# Patient Record
Sex: MALE | Race: WHITE | NOT HISPANIC OR LATINO | URBAN - METROPOLITAN AREA
[De-identification: names, ages, dates, MRNs, and addresses within clinical notes are randomized per-mention and may not be internally consistent; named-entity substitution may affect disease eponyms.]

---

## 2018-07-11 ENCOUNTER — INPATIENT (INPATIENT)
Facility: HOSPITAL | Age: 73
LOS: 0 days | Discharge: HOME | DRG: 313 | End: 2018-07-12
Attending: FAMILY MEDICINE | Admitting: HOSPITALIST
Payer: COMMERCIAL

## 2018-07-11 VITALS — WEIGHT: 216.93 LBS | HEIGHT: 68 IN

## 2018-07-11 DIAGNOSIS — Z98.890 OTHER SPECIFIED POSTPROCEDURAL STATES: Chronic | ICD-10-CM

## 2018-07-11 DIAGNOSIS — I24.9 ACUTE ISCHEMIC HEART DISEASE, UNSPECIFIED: ICD-10-CM

## 2018-07-11 LAB
ALBUMIN SERPL ELPH-MCNC: 3.9 G/DL — SIGNIFICANT CHANGE UP (ref 3.3–5.2)
ALP SERPL-CCNC: 68 U/L — SIGNIFICANT CHANGE UP (ref 40–120)
ALT FLD-CCNC: 16 U/L — SIGNIFICANT CHANGE UP
ANION GAP SERPL CALC-SCNC: 12 MMOL/L — SIGNIFICANT CHANGE UP (ref 5–17)
APTT BLD: 31.3 SEC — SIGNIFICANT CHANGE UP (ref 27.5–37.4)
AST SERPL-CCNC: 16 U/L — SIGNIFICANT CHANGE UP
BASOPHILS # BLD AUTO: 0 K/UL — SIGNIFICANT CHANGE UP (ref 0–0.2)
BASOPHILS NFR BLD AUTO: 0.4 % — SIGNIFICANT CHANGE UP (ref 0–2)
BILIRUB SERPL-MCNC: 0.5 MG/DL — SIGNIFICANT CHANGE UP (ref 0.4–2)
BUN SERPL-MCNC: 17 MG/DL — SIGNIFICANT CHANGE UP (ref 8–20)
CALCIUM SERPL-MCNC: 9.5 MG/DL — SIGNIFICANT CHANGE UP (ref 8.6–10.2)
CHLORIDE SERPL-SCNC: 104 MMOL/L — SIGNIFICANT CHANGE UP (ref 98–107)
CK SERPL-CCNC: 64 U/L — SIGNIFICANT CHANGE UP (ref 30–200)
CO2 SERPL-SCNC: 24 MMOL/L — SIGNIFICANT CHANGE UP (ref 22–29)
CREAT SERPL-MCNC: 0.95 MG/DL — SIGNIFICANT CHANGE UP (ref 0.5–1.3)
EOSINOPHIL # BLD AUTO: 0.1 K/UL — SIGNIFICANT CHANGE UP (ref 0–0.5)
EOSINOPHIL NFR BLD AUTO: 1.4 % — SIGNIFICANT CHANGE UP (ref 0–5)
GLUCOSE SERPL-MCNC: 105 MG/DL — SIGNIFICANT CHANGE UP (ref 70–115)
HCT VFR BLD CALC: 44.4 % — SIGNIFICANT CHANGE UP (ref 42–52)
HGB BLD-MCNC: 15.2 G/DL — SIGNIFICANT CHANGE UP (ref 14–18)
INR BLD: 1.06 RATIO — SIGNIFICANT CHANGE UP (ref 0.88–1.16)
LYMPHOCYTES # BLD AUTO: 1.4 K/UL — SIGNIFICANT CHANGE UP (ref 1–4.8)
LYMPHOCYTES # BLD AUTO: 25.4 % — SIGNIFICANT CHANGE UP (ref 20–55)
MCHC RBC-ENTMCNC: 30 PG — SIGNIFICANT CHANGE UP (ref 27–31)
MCHC RBC-ENTMCNC: 34.2 G/DL — SIGNIFICANT CHANGE UP (ref 32–36)
MCV RBC AUTO: 87.6 FL — SIGNIFICANT CHANGE UP (ref 80–94)
MONOCYTES # BLD AUTO: 0.4 K/UL — SIGNIFICANT CHANGE UP (ref 0–0.8)
MONOCYTES NFR BLD AUTO: 7.2 % — SIGNIFICANT CHANGE UP (ref 3–10)
NEUTROPHILS # BLD AUTO: 3.6 K/UL — SIGNIFICANT CHANGE UP (ref 1.8–8)
NEUTROPHILS NFR BLD AUTO: 65.2 % — SIGNIFICANT CHANGE UP (ref 37–73)
NT-PROBNP SERPL-SCNC: 99 PG/ML — SIGNIFICANT CHANGE UP (ref 0–300)
PLATELET # BLD AUTO: 166 K/UL — SIGNIFICANT CHANGE UP (ref 150–400)
POTASSIUM SERPL-MCNC: 3.9 MMOL/L — SIGNIFICANT CHANGE UP (ref 3.5–5.3)
POTASSIUM SERPL-SCNC: 3.9 MMOL/L — SIGNIFICANT CHANGE UP (ref 3.5–5.3)
PROT SERPL-MCNC: 7.1 G/DL — SIGNIFICANT CHANGE UP (ref 6.6–8.7)
PROTHROM AB SERPL-ACNC: 11.7 SEC — SIGNIFICANT CHANGE UP (ref 9.8–12.7)
RBC # BLD: 5.07 M/UL — SIGNIFICANT CHANGE UP (ref 4.6–6.2)
RBC # FLD: 13.6 % — SIGNIFICANT CHANGE UP (ref 11–15.6)
SODIUM SERPL-SCNC: 140 MMOL/L — SIGNIFICANT CHANGE UP (ref 135–145)
TROPONIN T SERPL-MCNC: <0.01 NG/ML — SIGNIFICANT CHANGE UP (ref 0–0.06)
TROPONIN T SERPL-MCNC: <0.01 NG/ML — SIGNIFICANT CHANGE UP (ref 0–0.06)
WBC # BLD: 5.5 K/UL — SIGNIFICANT CHANGE UP (ref 4.8–10.8)
WBC # FLD AUTO: 5.5 K/UL — SIGNIFICANT CHANGE UP (ref 4.8–10.8)

## 2018-07-11 PROCEDURE — 99223 1ST HOSP IP/OBS HIGH 75: CPT | Mod: GC

## 2018-07-11 PROCEDURE — 99285 EMERGENCY DEPT VISIT HI MDM: CPT

## 2018-07-11 PROCEDURE — 71275 CT ANGIOGRAPHY CHEST: CPT | Mod: 26

## 2018-07-11 PROCEDURE — 71046 X-RAY EXAM CHEST 2 VIEWS: CPT | Mod: 26

## 2018-07-11 PROCEDURE — 93010 ELECTROCARDIOGRAM REPORT: CPT

## 2018-07-11 PROCEDURE — 74174 CTA ABD&PLVS W/CONTRAST: CPT | Mod: 26

## 2018-07-11 RX ORDER — DIPHENHYDRAMINE HCL 50 MG
50 CAPSULE ORAL ONCE
Qty: 0 | Refills: 0 | Status: COMPLETED | OUTPATIENT
Start: 2018-07-11 | End: 2018-07-11

## 2018-07-11 RX ORDER — PANTOPRAZOLE SODIUM 20 MG/1
40 TABLET, DELAYED RELEASE ORAL
Qty: 0 | Refills: 0 | Status: DISCONTINUED | OUTPATIENT
Start: 2018-07-11 | End: 2018-07-12

## 2018-07-11 RX ORDER — MORPHINE SULFATE 50 MG/1
1 CAPSULE, EXTENDED RELEASE ORAL ONCE
Qty: 0 | Refills: 0 | Status: DISCONTINUED | OUTPATIENT
Start: 2018-07-11 | End: 2018-07-12

## 2018-07-11 RX ORDER — CLOPIDOGREL BISULFATE 75 MG/1
75 TABLET, FILM COATED ORAL DAILY
Qty: 0 | Refills: 0 | Status: DISCONTINUED | OUTPATIENT
Start: 2018-07-11 | End: 2018-07-12

## 2018-07-11 RX ORDER — NITROGLYCERIN 6.5 MG
0.4 CAPSULE, EXTENDED RELEASE ORAL
Qty: 0 | Refills: 0 | Status: DISCONTINUED | OUTPATIENT
Start: 2018-07-11 | End: 2018-07-12

## 2018-07-11 RX ORDER — ISOSORBIDE MONONITRATE 60 MG/1
120 TABLET, EXTENDED RELEASE ORAL DAILY
Qty: 0 | Refills: 0 | Status: DISCONTINUED | OUTPATIENT
Start: 2018-07-11 | End: 2018-07-12

## 2018-07-11 RX ORDER — LEVOTHYROXINE SODIUM 125 MCG
175 TABLET ORAL DAILY
Qty: 0 | Refills: 0 | Status: DISCONTINUED | OUTPATIENT
Start: 2018-07-11 | End: 2018-07-12

## 2018-07-11 RX ORDER — HYDROCORTISONE 20 MG
200 TABLET ORAL ONCE
Qty: 0 | Refills: 0 | Status: COMPLETED | OUTPATIENT
Start: 2018-07-11 | End: 2018-07-11

## 2018-07-11 RX ORDER — FINASTERIDE 5 MG/1
5 TABLET, FILM COATED ORAL DAILY
Qty: 0 | Refills: 0 | Status: DISCONTINUED | OUTPATIENT
Start: 2018-07-11 | End: 2018-07-12

## 2018-07-11 RX ORDER — ASPIRIN/CALCIUM CARB/MAGNESIUM 324 MG
325 TABLET ORAL DAILY
Qty: 0 | Refills: 0 | Status: DISCONTINUED | OUTPATIENT
Start: 2018-07-11 | End: 2018-07-12

## 2018-07-11 RX ORDER — ATORVASTATIN CALCIUM 80 MG/1
20 TABLET, FILM COATED ORAL AT BEDTIME
Qty: 0 | Refills: 0 | Status: DISCONTINUED | OUTPATIENT
Start: 2018-07-11 | End: 2018-07-12

## 2018-07-11 RX ORDER — LISINOPRIL 2.5 MG/1
5 TABLET ORAL DAILY
Qty: 0 | Refills: 0 | Status: DISCONTINUED | OUTPATIENT
Start: 2018-07-11 | End: 2018-07-12

## 2018-07-11 RX ORDER — METOPROLOL TARTRATE 50 MG
50 TABLET ORAL DAILY
Qty: 0 | Refills: 0 | Status: DISCONTINUED | OUTPATIENT
Start: 2018-07-11 | End: 2018-07-12

## 2018-07-11 RX ADMIN — Medication 200 MILLIGRAM(S): at 16:02

## 2018-07-11 RX ADMIN — Medication 50 MILLIGRAM(S): at 18:41

## 2018-07-11 NOTE — H&P ADULT - FAMILY HISTORY
Family history of prostate cancer in father     Father  Still living? Unknown  Family history of rheumatoid arthritis, Age at diagnosis: Age Unknown     Mother  Still living? Unknown  Family history of acute myocardial infarction, Age at diagnosis: Age Unknown

## 2018-07-11 NOTE — H&P ADULT - NSHPPHYSICALEXAM_GEN_ALL_CORE
Vitals  T(C): 36.7 (07-11-18 @ 12:54), Max: 36.7 (07-11-18 @ 12:54)  HR: 60 (07-11-18 @ 18:12) (56 - 60)  BP: 134/77 (07-11-18 @ 18:12) (134/77 - 149/84)  RR: 18 (07-11-18 @ 18:12) (18 - 18)  SpO2: 99% (07-11-18 @ 18:12) (98% - 99%)    Physical Exam:   GENERAL: elderly, NAD, well-groomed, well-developed  HEAD:  Atraumatic, Normocephalic  EYES: EOMI, PERRLA, conjunctiva and sclera clear  NECK: Supple, No JVD, Normal thyroid  NERVOUS SYSTEM:  Alert & Oriented X3, Good concentration; Motor Strength 5/5 B/L upper and lower extremities;   RESP: Clear to auscultation bilaterally; No rales, rhonchi, wheezing, or rubs  CVS: Bradycardic, regular rhythm; No murmurs, rubs, or gallops  GI: Soft, Nontender, Nondistended; Bowel sounds present, reducible umbilical hernia present  EXTREMITIES:  2+ Peripheral Pulses, No clubbing, cyanosis, or edema, varicose veins over anterior RLE  LYMPH: No cervical or supraclavicular lymphadenopathy noted  SKIN: two 2cm coin shaped lesions of left cheek

## 2018-07-11 NOTE — H&P ADULT - ASSESSMENT
71 yo male with PMH of CAD w/ 9 stents, HTN, BPH, hypothyroidism, DVT formerly on Coumadin, GERD, skin cancer presents w/ chest pain being admitted for r/o ACS.  Admit to Resident Service under care of Dr Verdin  Admit to Telemetry unit  Activity:  Diet: DASH/TLC    Chest pain r/o ACS  Resolved since presentation to ED  EKG: no acute ST changes or T wave invsersions  Trops: neg x2  CTA Chest:   Continue ASA 325mg QD, Plavix 75mg QD  Start Lisinopril 5mg QD w/ parameters  TTE pending  Pharmacological stress test in AM  Cardio consulted, apprec rec 73 yo male with PMH of CAD w/ 9 stents, HTN, BPH, hypothyroidism, DVT formerly on Coumadin, GERD, skin cancer presents w/ chest pain being admitted for r/o ACS.  Admit to Resident Service under care of Dr Verdin  Admit to Telemetry unit  Activity:  Diet: DASH/TLC    Chest pain r/o ACS  Resolved since presentation to ED  EKG: no acute ST changes or T wave invsersions  Trops: neg x2  CTA Chest: neg for aortic aneurysm or dissection, or PE  Continue ASA 325mg QD, Plavix 75mg QD  Continue Atorvastatin 20mg QD  Continue Metoprolol 50mg QD  Start Lisinopril 5mg QD w/ parameters  TTE pending  Lipid panel and HbA1c in AM  Pharmacological stress test in AM  Cardio consulted, apprec rec, hold Toprol if HR <30    Hypertension  Currently stable  Continue home meds, Metoprolol, Imdur  Will monitor    BPH  Stable, at baseline  Continue home meds, Rapaflo, Finasteride    Hypothyroidism  Continue home med, Synthroid 175mcg QD  TSH in AM    GERD  Stable  Continue Pantoprazole 40mg QD    Preventative Measure  Lovenox 40mg QD  VCD Boots 71 yo male with PMH of CAD w/ 9 stents, HTN, BPH, hypothyroidism, DVT formerly on Coumadin, GERD, skin cancer presents w/ chest pain being admitted for r/o ACS.  Admit to Resident Service under care of Dr Verdin  Admit to Telemetry unit  Activity:  Diet: DASH/TLC    Chest pain r/o ACS  Resolved since presentation to ED  EKG: no acute ST changes or T wave invsersions  Trops: neg x2  CTA Chest: neg for aortic aneurysm or dissection, or PE  Nitroglycerin subling PRN for chest pain  Morphine 1mg IV PRN for severe chest pain  Continue ASA 325mg QD, Plavix 75mg QD  Continue Atorvastatin 20mg QD  Continue Metoprolol 50mg QD  Start Lisinopril 5mg QD w/ parameters  TTE pending  Lipid panel and HbA1c in AM  Pharmacological stress test in AM  Cardio consulted, apprec rec, hold Toprol if HR <30    Hypertension  Currently stable  Continue home meds, Metoprolol, Imdur  Will monitor    BPH  Stable, at baseline  Continue home meds, Rapaflo, Finasteride    Hypothyroidism  Continue home med, Synthroid 175mcg QD  TSH in AM    GERD  Stable  Continue Pantoprazole 40mg QD    Preventative Measure  Lovenox 40mg QD  VCD Boots

## 2018-07-11 NOTE — ED STATDOCS - PROGRESS NOTE DETAILS
72 year old male with PMh HTN, HLD anmd CAD s/p 9 stents presents with chest and arm pain. Pt states he woke from sleep with a left arm pain, left sided chest pressure, and numbness in his 4th and 5th digits. He reports associated SOb with the pain. States pain has improved.  Gen: NAD, well appearing CV: RRR Pul: CTA b/l Abd: Soft, non-distended, non-tender Neuro: no focal deficits  ekg no stemi, will upgrade to main

## 2018-07-11 NOTE — ED PROVIDER NOTE - MUSCULOSKELETAL, MLM
Spine appears normal, range of motion is not limited, no muscle or joint tenderness, currently does not have any back pain

## 2018-07-11 NOTE — CONSULT NOTE ADULT - SUBJECTIVE AND OBJECTIVE BOX
[facility]                                                        Office: [address] [address]                                                       Telephone: [phone]. Fax:[phone]    Patient is a 72y old  Male who presents with a chief complaint of chest pain    HPI: 73 y/o male with hx of CAD, MI 17 yrs prior, with multiple PCI (last of which > 10 yrs ago), smoker, RLE DVT, hypertension, hyperlipidemia, hypothyroidism, who has not been to his outpatient cardiologist (Dr. Houston Randolph) in the past 5 years, presents with acute onset chest pain this morning associated with left arm numbness, including 3 medial digits on the left hand.  Associated with jaw tightness.  These symptoms were similar to what he felt during his MI 17 yrs ago. Chest discomfort was mild to moderate in intensity, left sided,pressure-like with radiation to the back, without associated shortness of breath, lightheadedness, diaphoresis.  Duration ~ a couple of hours.  No specific provocators or alleviators.  Currently denies any chest pain.  Left hand numbness ((ulnar nerve distribution) persists.  ECG sinus rhythm with PACs.  Currently sinus bradycardia 48 bpm on telemetry.  CXR unremarkable. First set of cardiac enzymes negative. Tolerated IV contrast in past, reports shortness of breath after Definity (echo contrast ) administration during a stress echocardiogram several years ago.     PAST MEDICAL & SURGICAL HISTORY:  CAD, MI 17 yrs prior, with multiple PCI (last of which > 10 yrs ago), hypertension, hyperlipidemia, hypothyroidism, DVT  rotator cuff surgery left  right shoulder and arm injury  skin cancer    Allergies    Definity (Short breath)  IV Contrast (Short breath)    Intolerances        Home Medications:  aspirin 325  metoprolol er 50  rapaflo 8  levothyroxine 175  atorvastatin 20  clopidogrel 75  imdur 120  finasteride 5  omeprazole 20      MEDICATIONS  (STANDING):    MEDICATIONS  (PRN):    FAMILY HISTORY: no pertinent family hx      SOCIAL HISTORY: + tobacco/no  etoh/ No illicit drug use    PREVIOUS DIAGNOSTIC TESTING:  NONE IN OUR SYSTEM    REVIEW OF SYSTEMS:  CONSTITUTIONAL: [-]fever   [-] weight loss   [-] fatigue  EYES: [-]  eye pain   [-] visual disturbances      [-]  discharge  ENMT:  [-]  difficulty hearing,   [-]  tinnitus   [-] vertigo    [-]  sinus or throat pain  NECK: [-]  pain or stiffness  RESPIRATORY: [-]  cough 	[-] wheezing 	[-]  hemoptysis 		[-]   Shortness of Breath  CARDIOVASCULAR: [+]  chest pain	[-] palpitations		[-]  passing out 		[-] dizziness 	[-]  leg swelling  		[-]  PND 	[-] orthopnea  GASTROINTESTINAL: [-]  abdominal pain		[-]nausea	[-] vomiting	[-]  hematemesis 	[-]  diarrhea  	[-] constipation 		[-]  melena 	[-] hematochezia.  GENITOURINARY: [-] dysuria	[-] frequency	[-] hematuria	[-]  incontinence  NEUROLOGICAL: [-]  headaches		[-] memory loss 	[-]  loss of strength  			[+]  numbness/tingling 	[-]  tremors  SKIN: [-]  itching 	[-] rashes 	[-]  lesions   LYMPH Nodes: [-] enlarged glands  ENDOCRINE: [-] heat or cold intolerance 	[-]   hair loss  MUSCULOSKELETAL: [-] joint pain  [-] joint swelling	[-]  muscle, back, or extremity pain  PSYCHIATRIC: [-]  depression	[-] anxiety	[-] mood swings		[-]  difficulty sleeping   HEME: [-]  easy bruising 	[-]  bleeding   ALLERY AND IMMUNOLOGIC: [-]  hives or eczema	    Vital Signs Last 24 Hrs  T(C): 36.7 (11 Jul 2018 12:54), Max: 36.7 (11 Jul 2018 12:54)  T(F): 98 (11 Jul 2018 12:54), Max: 98 (11 Jul 2018 12:54)  HR: 56 (11 Jul 2018 12:54) (56 - 56)  BP: 149/84 (11 Jul 2018 12:54) (149/84 - 149/84)  RR: 18 (11 Jul 2018 12:54) (18 - 18)  SpO2: 98% (11 Jul 2018 12:54) (98% - 98%)  Daily Height in cm: 172.72 (11 Jul 2018 12:44)      PHYSICAL EXAM:  Appearance: Normal, well nourished, NAD	  HEENT:   Normal oral mucosa, PERRL, EOMI, sclera non-icteric	  Lymphatic: No cervical lymphadenopathy  Cardiovascular: Normal S1 S2, No JVD, No cardiac murmurs, No carotid bruits, No peripheral edema  Respiratory: Lungs clear to auscultation	  Psychiatry: A & O x 3, Mood & affect appropriate  Gastrointestinal:  Soft, Non-tender, + BS, no bruits	  Skin: No rashes, No ecchymoses, No cyanosis, Dry  Neurologic: Grossly non-focal with full strength in all four extremities  Extremities: Normal range of motion, No clubbing, cyanosis or edema  Vascular: Peripheral pulses palpable 2+ bilaterally, warm    INTERPRETATION OF TELEMETRY: SR 40s-50s    ECG (tracing reviewed by me): sinus bradycardia, PACs     LABS:                        15.2   5.5   )-----------( 166      ( 11 Jul 2018 13:49 )             44.4     07-11    140  |  104  |  17.0  ----------------------------<  105  3.9   |  24.0  |  0.95    Ca    9.5      11 Jul 2018 13:49    TPro  7.1  /  Alb  3.9  /  TBili  0.5  /  DBili  x   /  AST  16  /  ALT  16  /  AlkPhos  68  07-11    CARDIAC MARKERS ( 11 Jul 2018 13:49 )  x     / <0.01 ng/mL / 64 U/L / x     / x        PT/INR - ( 11 Jul 2018 13:49 )   PT: 11.7 sec;   INR: 1.06 ratio         PTT - ( 11 Jul 2018 13:49 )  PTT:31.3 sec    BNPSerum Pro-Brain Natriuretic Peptide: 99 pg/mL (07-11 @ 13:49)    RADIOLOGY & ADDITIONAL STUDIES:  CXR (image reviewed by me): < from: Xray Chest 2 Views PA/Lat (07.11.18 @ 14:32) >  The cardiomediastinal silhouette is normal. The richard are not enlarged.   The trachea is midline. There is no focal infiltrate or pleural effusion.   The osseous structures demonstrate osteopenia and extensive productive   change in the thoracic spine.    Impression: No active disease.    < end of copied text >

## 2018-07-11 NOTE — ED ADULT TRIAGE NOTE - CHIEF COMPLAINT QUOTE
pt c/o midsternal CP, left arm pain and numbess/tingling to left finger tips. pt states he awoke with this pain. pt denies SOB. and states the pain radiates into his back. resp even unlabored.

## 2018-07-11 NOTE — ED ADULT NURSE REASSESSMENT NOTE - NS ED NURSE REASSESS COMMENT FT1
pt status unchanged, refer to flowsheet and chart, pt safety maintained, pt hemodynamically stable, pending CT

## 2018-07-11 NOTE — ED PROVIDER NOTE - CHPI ED SYMPTOMS POS
BACK PAIN/CHEST TIGHTNESS/SHORTNESS OF BREATH/CHEST PAIN/sub sternal chest pain radiating to right inferior scapula

## 2018-07-11 NOTE — ED PROVIDER NOTE - ATTENDING CONTRIBUTION TO CARE
After interviewing the patient, I agree with the HPI as discussed . My personal exam reveals findings consistent with those discussed. Available diagnostic studies were reviewed. I confirm the diagnosis as discussed with the resident The care plan articulated is consistent with our discussion of the patient's particulars. In brief, Hx [chest pain ],Exam sis2 normal no tenderness ], Plan[pt was seen by cardiology and will be admitted for stress test and further work-up ]

## 2018-07-11 NOTE — ED PROVIDER NOTE - OBJECTIVE STATEMENT
Patient is a 73yo male complaining of substernal chest pain that radiated to the back that has dissipated. Patient is also complaining of numbness and tingling down his left hand to his 3rd,4th and 5th fingers that he describes as pins and needles. Patient states that he has had multiple rotator cuff injuries and surgeries on his left shoulder. Patient only took his aspirin this morning. Patient states the pain in his chest was the same pain that he felt when he had his MI. Patient states that he does not have any pain now but when he did it was also accompanied by SOB. Patient describes the chest pain as someone punched him in the chest.   PMH: MI, HTN, Hypothyroidism   PSH: 9 stents placed, rotator cuff surgery on left shoulder  allergies: IV dye, and Infinity contrast  SH: drinks 1-2 beers socially, has been smoking 4 cigarettes a day for 55 years

## 2018-07-11 NOTE — H&P ADULT - PMH
BPH (benign prostatic hyperplasia)    CAD (coronary artery disease)    DVT (deep venous thrombosis)    GERD (gastroesophageal reflux disease)    HTN (hypertension)    Hypothyroidism    Skin cancer

## 2018-07-11 NOTE — H&P ADULT - HISTORY OF PRESENT ILLNESS
73 yo male with PMH of CAD w/ 9 stents, HTN, BPH, hypothyroidism, DVT formerly on Coumadin, GERD, skin cancer presents w/ chest pain which began at 5 AM. he described the pain as a 4/10 lasting 7 hours, pressure-like, in the center of his chest, radiating to the back. associated with SOB that lasted 2.5 hrs. not worsened with exertion, not alleviated by rest. patient did not take any medication. no asscd diaphoresis or N/V. pain is similar to pain experienced w/ prior MI. patient's last cath was five years ago, as well as last time he saw the cardiologist. he has been experiencing similar pain twice per wk for 2-3 mths, lasting 1-2 hours each episode.   patient also experiencing numbness in left arm which began around same time as chest pain today. numbness in arm has subsided, some remaining tingling in left fingers. patient reports episodes of similar numbness "every now and then", believes it is related to position he sleeps. normally resolves on own, today persisted longer than usual.

## 2018-07-11 NOTE — H&P ADULT - NSHPSOCIALHISTORY_GEN_ALL_CORE
smokes 10 cigarettes/wk. smoking since 16 yo, quit briefly after MI in 2001.  denies alcohol use, recreational drug use

## 2018-07-11 NOTE — CONSULT NOTE ADULT - ASSESSMENT
71 y/o male with hx of CAD, MI 17 yrs prior, with multiple PCI (last of which > 10 yrs ago), smoker, RLE DVT, hypertension, hyperlipidemia, BPH, hypothyroidism, who has not been to his outpatient cardiologist (Dr. Houston Randolph) in the past 5 years, presents with acute onset chest pain this morning associated with left arm numbness, including 3 medial digits on the left hand.  Associated with jaw tightness.  These symptoms were similar to what he felt during his MI 17 yrs ago. Chest discomfort was mild to moderate in intensity, left sided,pressure-like with radiation to the back, without associated shortness of breath, lightheadedness, diaphoresis.  Duration ~ a couple of hours.  No specific provocators or alleviators. Currently denies any chest pain.  Left hand numbness ((ulnar nerve distribution) persists.  ECG sinus rhythm with PACs.  Currently sinus bradycardia 48 bpm on telemetry.  CXR unremarkable. First set of cardiac enzymes negative. Definity allergy/reaction.    # chest pain - will need ischemia evaluation, doubt aortic dissection, CTA chest pending.  If CTA chest unremarkable, will arrange for pharmacological nuclear stress testing in am.  Echo (without contrast use).  Monitor on telemetry. ASA. Metoprolol, statin, plavix.   # CAD s/p MI in distant past, multiple PCI "9 stents" - as above  # tobacco use - counseled on cessation.   # hyperlipidemia - resume statin  # hypertension- resume antihypertensives and BPH meds (toprol, rapaflo, imdur, finasteride)  # sinus bradycardia - ok to continue metoprolol, monitor on telemetry.  If significant pauses or HRs in the 30s overnight, will reduce toprol dose. TTE.     LMWH or Heparin for DVT prophylaxis  D/W Dr. Rasheed

## 2018-07-11 NOTE — ED PROVIDER NOTE - MEDICAL DECISION MAKING DETAILS
Patient with possible acs with significant cardiac history. Patient had labs drawn, EKG, and a cardiac consult.

## 2018-07-11 NOTE — ED ADULT NURSE NOTE - OBJECTIVE STATEMENT
c/o intermittent chest pain since 0530 with intermittent numbness and tingling in fiongertips. Hx CAD, MI, HTN, HLD

## 2018-07-12 VITALS — HEART RATE: 50 BPM | TEMPERATURE: 99 F | SYSTOLIC BLOOD PRESSURE: 126 MMHG | DIASTOLIC BLOOD PRESSURE: 70 MMHG

## 2018-07-12 LAB
ANION GAP SERPL CALC-SCNC: 14 MMOL/L — SIGNIFICANT CHANGE UP (ref 5–17)
BUN SERPL-MCNC: 17 MG/DL — SIGNIFICANT CHANGE UP (ref 8–20)
CALCIUM SERPL-MCNC: 9.4 MG/DL — SIGNIFICANT CHANGE UP (ref 8.6–10.2)
CHLORIDE SERPL-SCNC: 104 MMOL/L — SIGNIFICANT CHANGE UP (ref 98–107)
CHOLEST SERPL-MCNC: 107 MG/DL — LOW (ref 110–199)
CO2 SERPL-SCNC: 25 MMOL/L — SIGNIFICANT CHANGE UP (ref 22–29)
CREAT SERPL-MCNC: 0.94 MG/DL — SIGNIFICANT CHANGE UP (ref 0.5–1.3)
GLUCOSE SERPL-MCNC: 116 MG/DL — HIGH (ref 70–115)
HBA1C BLD-MCNC: 5.7 % — HIGH (ref 4–5.6)
HDLC SERPL-MCNC: 35 MG/DL — LOW
LIPID PNL WITH DIRECT LDL SERPL: 58 MG/DL — SIGNIFICANT CHANGE UP
MAGNESIUM SERPL-MCNC: 2 MG/DL — SIGNIFICANT CHANGE UP (ref 1.8–2.6)
PHOSPHATE SERPL-MCNC: 3.4 MG/DL — SIGNIFICANT CHANGE UP (ref 2.4–4.7)
POTASSIUM SERPL-MCNC: 4 MMOL/L — SIGNIFICANT CHANGE UP (ref 3.5–5.3)
POTASSIUM SERPL-SCNC: 4 MMOL/L — SIGNIFICANT CHANGE UP (ref 3.5–5.3)
SODIUM SERPL-SCNC: 143 MMOL/L — SIGNIFICANT CHANGE UP (ref 135–145)
TOTAL CHOLESTEROL/HDL RATIO MEASUREMENT: 3 RATIO — LOW (ref 3.4–9.6)
TRIGL SERPL-MCNC: 72 MG/DL — SIGNIFICANT CHANGE UP (ref 10–200)
TROPONIN T SERPL-MCNC: <0.01 NG/ML — SIGNIFICANT CHANGE UP (ref 0–0.06)
TSH SERPL-MCNC: 0.28 UIU/ML — SIGNIFICANT CHANGE UP (ref 0.27–4.2)

## 2018-07-12 PROCEDURE — 85610 PROTHROMBIN TIME: CPT

## 2018-07-12 PROCEDURE — 93017 CV STRESS TEST TRACING ONLY: CPT

## 2018-07-12 PROCEDURE — 85027 COMPLETE CBC AUTOMATED: CPT

## 2018-07-12 PROCEDURE — 96374 THER/PROPH/DIAG INJ IV PUSH: CPT | Mod: XU

## 2018-07-12 PROCEDURE — A9500: CPT

## 2018-07-12 PROCEDURE — 36415 COLL VENOUS BLD VENIPUNCTURE: CPT

## 2018-07-12 PROCEDURE — 71275 CT ANGIOGRAPHY CHEST: CPT

## 2018-07-12 PROCEDURE — 99232 SBSQ HOSP IP/OBS MODERATE 35: CPT

## 2018-07-12 PROCEDURE — 83735 ASSAY OF MAGNESIUM: CPT

## 2018-07-12 PROCEDURE — 93018 CV STRESS TEST I&R ONLY: CPT

## 2018-07-12 PROCEDURE — 83036 HEMOGLOBIN GLYCOSYLATED A1C: CPT

## 2018-07-12 PROCEDURE — 84443 ASSAY THYROID STIM HORMONE: CPT

## 2018-07-12 PROCEDURE — 74174 CTA ABD&PLVS W/CONTRAST: CPT

## 2018-07-12 PROCEDURE — 80053 COMPREHEN METABOLIC PANEL: CPT

## 2018-07-12 PROCEDURE — 83880 ASSAY OF NATRIURETIC PEPTIDE: CPT

## 2018-07-12 PROCEDURE — 78452 HT MUSCLE IMAGE SPECT MULT: CPT

## 2018-07-12 PROCEDURE — 99285 EMERGENCY DEPT VISIT HI MDM: CPT | Mod: 25

## 2018-07-12 PROCEDURE — 93005 ELECTROCARDIOGRAM TRACING: CPT

## 2018-07-12 PROCEDURE — 99238 HOSP IP/OBS DSCHRG MGMT 30/<: CPT

## 2018-07-12 PROCEDURE — 80061 LIPID PANEL: CPT

## 2018-07-12 PROCEDURE — 71046 X-RAY EXAM CHEST 2 VIEWS: CPT

## 2018-07-12 PROCEDURE — 84484 ASSAY OF TROPONIN QUANT: CPT

## 2018-07-12 PROCEDURE — 85730 THROMBOPLASTIN TIME PARTIAL: CPT

## 2018-07-12 PROCEDURE — 93306 TTE W/DOPPLER COMPLETE: CPT

## 2018-07-12 PROCEDURE — 96375 TX/PRO/DX INJ NEW DRUG ADDON: CPT | Mod: XU

## 2018-07-12 PROCEDURE — 80048 BASIC METABOLIC PNL TOTAL CA: CPT

## 2018-07-12 PROCEDURE — 82550 ASSAY OF CK (CPK): CPT

## 2018-07-12 PROCEDURE — 84100 ASSAY OF PHOSPHORUS: CPT

## 2018-07-12 PROCEDURE — 93016 CV STRESS TEST SUPVJ ONLY: CPT

## 2018-07-12 PROCEDURE — 78452 HT MUSCLE IMAGE SPECT MULT: CPT | Mod: 26

## 2018-07-12 RX ORDER — METOPROLOL TARTRATE 50 MG
25 TABLET ORAL DAILY
Qty: 0 | Refills: 0 | Status: DISCONTINUED | OUTPATIENT
Start: 2018-07-12 | End: 2018-07-12

## 2018-07-12 RX ORDER — FINASTERIDE 5 MG/1
1 TABLET, FILM COATED ORAL
Qty: 0 | Refills: 0 | COMMUNITY

## 2018-07-12 RX ORDER — ASPIRIN/CALCIUM CARB/MAGNESIUM 324 MG
1 TABLET ORAL
Qty: 0 | Refills: 0 | COMMUNITY

## 2018-07-12 RX ORDER — CLOPIDOGREL BISULFATE 75 MG/1
1 TABLET, FILM COATED ORAL
Qty: 0 | Refills: 0 | COMMUNITY

## 2018-07-12 RX ORDER — METOPROLOL TARTRATE 50 MG
1 TABLET ORAL
Qty: 0 | Refills: 0 | COMMUNITY

## 2018-07-12 RX ORDER — ISOSORBIDE MONONITRATE 60 MG/1
1 TABLET, EXTENDED RELEASE ORAL
Qty: 0 | Refills: 0 | COMMUNITY

## 2018-07-12 RX ORDER — LEVOTHYROXINE SODIUM 125 MCG
1 TABLET ORAL
Qty: 0 | Refills: 0 | COMMUNITY

## 2018-07-12 RX ORDER — ATORVASTATIN CALCIUM 80 MG/1
1 TABLET, FILM COATED ORAL
Qty: 0 | Refills: 0 | COMMUNITY

## 2018-07-12 RX ORDER — ENOXAPARIN SODIUM 100 MG/ML
40 INJECTION SUBCUTANEOUS EVERY 24 HOURS
Qty: 0 | Refills: 0 | Status: DISCONTINUED | OUTPATIENT
Start: 2018-07-12 | End: 2018-07-12

## 2018-07-12 RX ORDER — SILODOSIN 4 MG/1
1 CAPSULE ORAL
Qty: 0 | Refills: 0 | COMMUNITY

## 2018-07-12 RX ORDER — OMEPRAZOLE 10 MG/1
1 CAPSULE, DELAYED RELEASE ORAL
Qty: 0 | Refills: 0 | COMMUNITY

## 2018-07-12 RX ORDER — METOPROLOL TARTRATE 50 MG
1 TABLET ORAL
Qty: 30 | Refills: 0 | OUTPATIENT
Start: 2018-07-12 | End: 2018-08-10

## 2018-07-12 RX ORDER — METOPROLOL TARTRATE 50 MG
25 TABLET ORAL EVERY 24 HOURS
Qty: 0 | Refills: 0 | Status: DISCONTINUED | OUTPATIENT
Start: 2018-07-12 | End: 2018-07-12

## 2018-07-12 RX ADMIN — Medication 325 MILLIGRAM(S): at 12:53

## 2018-07-12 RX ADMIN — CLOPIDOGREL BISULFATE 75 MILLIGRAM(S): 75 TABLET, FILM COATED ORAL at 12:53

## 2018-07-12 RX ADMIN — ATORVASTATIN CALCIUM 20 MILLIGRAM(S): 80 TABLET, FILM COATED ORAL at 01:35

## 2018-07-12 RX ADMIN — Medication 175 MICROGRAM(S): at 05:44

## 2018-07-12 RX ADMIN — PANTOPRAZOLE SODIUM 40 MILLIGRAM(S): 20 TABLET, DELAYED RELEASE ORAL at 05:43

## 2018-07-12 RX ADMIN — ISOSORBIDE MONONITRATE 120 MILLIGRAM(S): 60 TABLET, EXTENDED RELEASE ORAL at 12:53

## 2018-07-12 RX ADMIN — FINASTERIDE 5 MILLIGRAM(S): 5 TABLET, FILM COATED ORAL at 12:53

## 2018-07-12 NOTE — PROGRESS NOTE ADULT - ASSESSMENT
73 yo male with PMH of CAD w/ 9 stents, HTN, BPH, hypothyroidism, DVT formerly on Coumadin, GERD, skin cancer presents w/ chest pain being admitted for r/o ACS.    Chest pain r/o ACS  Resolved since presentation to ED  EKG: no acute ST changes or T wave invsersions  Trops: neg x2  CTA Chest: neg for aortic aneurysm or dissection, or PE  Nitroglycerin subling PRN for chest pain  Morphine 1mg IV PRN for severe chest pain  Continue ASA 325mg QD, Plavix 75mg QD  Continue Atorvastatin 20mg QD  Continue Metoprolol 50mg QD  Start Lisinopril 5mg QD w/ parameters  TTE pending  Lipid panel and HbA1c in AM  Pharmacological stress test in AM  Cardio consulted, apprec rec, hold Toprol if HR <30    Hypertension  Currently stable  Continue home meds, Metoprolol, Imdur  Will monitor    BPH  Stable, at baseline  Continue home meds, Rapaflo, Finasteride    Hypothyroidism  Continue home med, Synthroid 175mcg QD  TSH in AM    GERD  Stable  Continue Pantoprazole 40mg QD    Preventative Measure  Lovenox 40mg QD  VCD Boots 71 yo male with PMH of CAD w/ 9 stents, HTN, BPH, hypothyroidism, DVT formerly on Coumadin, GERD, skin cancer presents w/ chest pain being admitted for r/o ACS.    Chest pain r/o ACS  Resolved since presentation to ED  EKG: no acute ST changes or T wave invsersions  Trops: neg x3  CTA Chest: neg for aortic aneurysm or dissection, or PE  Nitroglycerin subling PRN for chest pain  Morphine 1mg IV PRN for severe chest pain  Continue ASA 325mg QD, Plavix 75mg QD  Continue Atorvastatin 20mg QD  Continue Metoprolol 50mg QD  TTE completed  Pharmacological stress test completed  Cardio consulted, apprec rec, hold Toprol if HR <30  Monitor on telemetry.     Hypertension  Currently stable  Continue home meds, Metoprolol, Imdur  Will monitor    BPH  Stable, at baseline  Continue home meds, Rapaflo, Finasteride    Hypothyroidism  Continue home med, Synthroid 175mcg QD  TSH in AM    GERD  Stable  Continue Pantoprazole 40mg QD    Preventative Measure  Lovenox 40mg QD  VCD Boots

## 2018-07-12 NOTE — PROGRESS NOTE ADULT - ASSESSMENT
73 y/o male with hx of CAD, MI 17 yrs prior, with multiple PCI (last of which > 10 yrs ago), smoker, RLE DVT, hypertension, hyperlipidemia, BPH, hypothyroidism, who has not been to his outpatient cardiologist (Dr. Houston Randolph) in the past 5 years, presents with acute onset chest pain this morning associated with left arm numbness, including 3 medial digits on the left hand.  Associated with jaw tightness.  These symptoms were similar to what he felt during his MI 17 yrs ago. Chest discomfort was mild to moderate in intensity, left sided,pressure-like with radiation to the back, without associated shortness of breath, lightheadedness, diaphoresis.  Duration ~ a couple of hours.  No specific provocators or alleviators. Currently denies any chest pain.  Left hand numbness ((ulnar nerve distribution) persists.  ECG sinus rhythm with PACs.  Currently sinus bradycardia 48 bpm on telemetry.  CXR unremarkable. First set of cardiac enzymes negative. Definity allergy/reaction.    # chest pain -  pharmacological nuclear stress testing, Echo (without contrast use).  Monitor on telemetry. ASA. Metoprolol, statin, plavix.   # CAD s/p MI in distant past, multiple PCI "9 stents" - as above. LDL at goal.   # tobacco use - counseled on cessation.   # hyperlipidemia - continue statin  # hypertension- controlled,  antihypertensives and BPH meds (toprol, rapaflo, imdur, finasteride)  # sinus bradycardia - drops to the 30s at night, will reduce toprol to 25 mg daily.  TTE pending.     LMWH or Heparin for DVT prophylaxis

## 2018-07-12 NOTE — DISCHARGE NOTE ADULT - PLAN OF CARE
Stable You received evaluation for your chest pain which brought you into the hospital. After receiving a diagnostic stress test and cardiology evaluation it was deemed that you did not have blockages of your arteries. Please follow up with your primary care doctor and Cardiologist within 1 week. To maintain within normal range Your blood pressure tends to be elevated unless we control it with medications and diet. We are giving you medications to maintain it in adequate levels. If it gets uncontrolled, it could cause serious diseases like a hearth attack or a stroke. Please follow up with your primary care physician and with cardiology. Also measure your blood pressure at home. If it is >140/90 consistently or you have palpitations, chest pain, shortness of breath, lightheadedness, intense headaches, please come back to the ED. Also follow up the diet recommendations as above. Please continue to take your thyroid medications as prescribed and follow up with your PMD. STable Please continue to take your acid reflux medications as prescribed and follow up with your PMD. Please continue to take your medications as prescribed and follow up with your PMD and urologist as required.

## 2018-07-12 NOTE — PROGRESS NOTE ADULT - SUBJECTIVE AND OBJECTIVE BOX
[facility]                                                        Office: [address] [address]                                                       Telephone: [phone]. Fax:[phone]      CARDIOLOGY PROGRESS NOTE   ([facility])    Subjective: Patient seen and examined.  Feels well.  Denies chest pain or shortness of breath.  No further paresthesias.  Bradycardic overnight 30s-40s.      CURRENT MEDICATIONS  isosorbide   mononitrate ER Tablet (IMDUR) 120 milliGRAM(s) Oral daily  metoprolol succinate ER 25 milliGRAM(s) Oral daily  nitroglycerin     SubLingual 0.4 milliGRAM(s) SubLingual every 5 minutes PRN  aspirin 325 milliGRAM(s) Oral daily  morphine  - Injectable 1 milliGRAM(s) IV Push once PRN  pantoprazole    Tablet 40 milliGRAM(s) Oral before breakfast  atorvastatin 20 milliGRAM(s) Oral at bedtime  finasteride 5 milliGRAM(s) Oral daily  levothyroxine 175 MICROGram(s) Oral daily  clopidogrel Tablet 75 milliGRAM(s) Oral daily  	  TELEMETRY: SB 50s  Vitals:  T(C): 36.4 (07-12-18 @ 07:05), Max: 36.7 (07-11-18 @ 12:54)  HR: 44 (07-12-18 @ 07:05) (44 - 60)  BP: 145/68 (07-12-18 @ 07:05) (129/58 - 149/84)  RR: 20 (07-12-18 @ 07:05) (18 - 20)  SpO2: 100% (07-12-18 @ 07:05) (98% - 100%)  Height (cm): 172.72 (07-11 @ 12:44)  Weight (kg): 98.244210493 (07-11 @ 12:44)  BMI (kg/m2): 33 (07-11 @ 12:44)  BSA (m2): 2.12 (07-11 @ 12:44)    PHYSICAL EXAM:  Appearance: Normal, NAD	  HEENT:   Normal oral mucosa, PERRL, EOMI	  Lymphatic: No lymphadenopathy  Cardiovascular: Normal S1 S2, No JVD, No murmurs, No edema  Respiratory: Lungs clear to auscultation	  Psychiatry: A & O x 3, Mood & affect appropriate  Gastrointestinal:  Soft, Non-tender, + BS, ventral hernia	  Skin: No rashes, No ecchymoses, No cyanosis  Neurologic: Non-focal  Extremities: Normal range of motion, No clubbing, cyanosis or edema  Vascular: Peripheral pulses palpable 2+ bilaterally, warm      DIAGNOSTIC TESTING:  Echocardiogram pending  Stress Test pending                          15.2   5.5   )-----------( 166      ( 11 Jul 2018 13:49 )             44.4     07-12    143  |  104  |  17.0  ----------------------------<  116<H>  4.0   |  25.0  |  0.94    Ca    9.4      12 Jul 2018 07:24  Phos  3.4     07-12  Mg     2.0     07-12    TPro  7.1  /  Alb  3.9  /  TBili  0.5  /  DBili  x   /  AST  16  /  ALT  16  /  AlkPhos  68  07-11    BNP: Serum Pro-Brain Natriuretic Peptide: 99 pg/mL (07-11 @ 13:49)    HgA1c: Hemoglobin A1C, Whole Blood: 5.7 % (07-12 @ 07:24)    TSH: Thyroid Stimulating Hormone, Serum: 0.28 uIU/mL (07-12 @ 07:24)    Lipid Profile in AM (07.12.18 @ 07:24)    Total Cholesterol/HDL Ratio Measurement: 3.0 Ratio    Cholesterol, Serum: 107 mg/dL    Triglycerides, Serum: 72 mg/dL    HDL Cholesterol, Serum: 35 mg/dL    Direct LDL: 58: LDL Cholesterol --- Interpretive Comment (for adults 18 and over)  Optimal LDL Level may vary based on clinical situation  Below 70                  Ideal for people at very high risk of heart  disease  Below 100                Ideal for people at risk of heart disease  100 - 129                   [place]  130 - 159                   Borderline high  160 - 189                   High  190 and Above          Very high mg/dL    < from: CT Angio Chest w/ IV Cont (07.11.18 @ 20:28) >  IMPRESSION: No evidence of aortic aneurysm or dissection  No evidence of pulmonary embolism  No acute pathology   Cholelithiasis with multiple calcified stones    < end of copied text >

## 2018-07-12 NOTE — DISCHARGE NOTE ADULT - PATIENT PORTAL LINK FT
You can access the [facility] Patient Portal, offered by [facility], by registering with the following website: [email or web address]

## 2018-07-12 NOTE — PROGRESS NOTE ADULT - SUBJECTIVE AND OBJECTIVE BOX
71 yo male with PMH of CAD w/ 9 stents, HTN, BPH, hypothyroidism, DVT formerly on Coumadin, GERD, skin came in with chest pain. Patients states that the pain has resolved now. He has no chest pain or arm pain. He denies dizziness, SOB, diaphoresis, nausea, vomitting or headaches. Patient said he was comfortable and feels like he is at his baseline. No acute events overnight.    Review of Systems: denies dizziness, headache, LOC, fall, fevers, chills, sweats, abd pain, N/V/D/C, dysuria, melena    PAST MEDICAL & SURGICAL HISTORY:  Skin cancer  Hypothyroidism  DVT (deep venous thrombosis)  GERD (gastroesophageal reflux disease)  BPH (benign prostatic hyperplasia)  HTN (hypertension)  CAD (coronary artery disease)  H/O repair of rotator cuff    Vital Signs Last 24 Hrs  T(C): 36.5 (12 Jul 2018 12:54), Max: 36.6 (12 Jul 2018 04:15)  T(F): 97.7 (12 Jul 2018 12:54), Max: 97.8 (12 Jul 2018 04:15)  HR: 52 (12 Jul 2018 12:54) (44 - 60)  BP: 117/69 (12 Jul 2018 12:54) (117/69 - 145/68)  BP(mean): --  RR: 20 (12 Jul 2018 12:54) (18 - 20)  SpO2: 97% (12 Jul 2018 12:54) (97% - 100%)    Physical Exam:   	  GENERAL: elderly, NAD, well-groomed, well-developed  HEAD:  Atraumatic, Normocephalic  EYES: EOMI, PERRLA, conjunctiva and sclera clear  NECK: Supple, No JVD, Normal thyroid  NERVOUS SYSTEM:  Alert & Oriented X3, Good concentration; Motor Strength 5/5 B/L upper and lower extremities;   RESP: Clear to auscultation bilaterally; No rales, rhonchi, wheezing, or rubs  CVS: Bradycardic, regular rhythm; No murmurs, rubs, or gallops  GI: Soft, Nontender, Nondistended; Bowel sounds present, reducible umbilical hernia present  EXTREMITIES:  2+ Peripheral Pulses, No clubbing, cyanosis, or edema, varicose veins over anterior RLE  LYMPH: No cervical or supraclavicular lymphadenopathy noted  SKIN: two 2cm coin shaped lesions of left cheek    07-12    143  |  104  |  17.0  ----------------------------<  116<H>  4.0   |  25.0  |  0.94    Ca    9.4      12 Jul 2018 07:24  Phos  3.4     07-12  Mg     2.0     07-12    TPro  7.1  /  Alb  3.9  /  TBili  0.5  /  DBili  x   /  AST  16  /  ALT  16  /  AlkPhos  68  07-11                          15.2   5.5   )-----------( 166      ( 11 Jul 2018 13:49 )             44.4       CARDIAC MARKERS ( 12 Jul 2018 01:07 )  x     / <0.01 ng/mL / x     / x     / x      CARDIAC MARKERS ( 11 Jul 2018 19:11 )  x     / <0.01 ng/mL / x     / x     / x      CARDIAC MARKERS ( 11 Jul 2018 13:49 )  x     / <0.01 ng/mL / 64 U/L / x     / x 71 yo male with PMH of CAD w/ 9 stents, HTN, BPH, hypothyroidism, DVT formerly on Coumadin, GERD, skin came in with chest pain. Patients states that the pain has resolved now. He has no chest pain or arm pain. He denies dizziness, SOB, diaphoresis, nausea, vomitting or headaches. Patient said he was comfortable and feels like he is at his baseline. No acute events overnight. Patient is ambulating fine.    Review of Systems: denies dizziness, headache, LOC, fall, fevers, chills, sweats, abd pain, N/V/D/C, dysuria, melena    PAST MEDICAL & SURGICAL HISTORY:  Skin cancer  Hypothyroidism  DVT (deep venous thrombosis)  GERD (gastroesophageal reflux disease)  BPH (benign prostatic hyperplasia)  HTN (hypertension)  CAD (coronary artery disease)  H/O repair of rotator cuff    Vital Signs Last 24 Hrs  T(C): 36.5 (12 Jul 2018 12:54), Max: 36.6 (12 Jul 2018 04:15)  T(F): 97.7 (12 Jul 2018 12:54), Max: 97.8 (12 Jul 2018 04:15)  HR: 52 (12 Jul 2018 12:54) (44 - 60)  BP: 117/69 (12 Jul 2018 12:54) (117/69 - 145/68)  BP(mean): --  RR: 20 (12 Jul 2018 12:54) (18 - 20)  SpO2: 97% (12 Jul 2018 12:54) (97% - 100%)    Physical Exam:   	  GENERAL: elderly, NAD, well-groomed, well-developed  HEAD:  Atraumatic, Normocephalic  EYES: EOMI, PERRLA, conjunctiva and sclera clear  NECK: Supple, No JVD, Normal thyroid  NERVOUS SYSTEM:  Alert & Oriented X3, Good concentration; Motor Strength 5/5 B/L upper and lower extremities;   RESP: Clear to auscultation bilaterally; No rales, rhonchi, wheezing, or rubs  CVS: Bradycardic, regular rhythm; No murmurs, rubs, or gallops  GI: Soft, Nontender, Nondistended; Bowel sounds present, reducible umbilical hernia present  EXTREMITIES:  2+ Peripheral Pulses, No clubbing, cyanosis, or edema, varicose veins over anterior RLE  LYMPH: No cervical or supraclavicular lymphadenopathy noted  SKIN: two 2cm coin shaped lesions of left cheek    07-12    143  |  104  |  17.0  ----------------------------<  116<H>  4.0   |  25.0  |  0.94    Ca    9.4      12 Jul 2018 07:24  Phos  3.4     07-12  Mg     2.0     07-12    TPro  7.1  /  Alb  3.9  /  TBili  0.5  /  DBili  x   /  AST  16  /  ALT  16  /  AlkPhos  68  07-11                          15.2   5.5   )-----------( 166      ( 11 Jul 2018 13:49 )             44.4       CARDIAC MARKERS ( 12 Jul 2018 01:07 )  x     / <0.01 ng/mL / x     / x     / x      CARDIAC MARKERS ( 11 Jul 2018 19:11 )  x     / <0.01 ng/mL / x     / x     / x      CARDIAC MARKERS ( 11 Jul 2018 13:49 )  x     / <0.01 ng/mL / 64 U/L / x     / x

## 2018-07-12 NOTE — DISCHARGE NOTE ADULT - MEDICATION SUMMARY - MEDICATIONS TO TAKE
I will START or STAY ON the medications listed below when I get home from the hospital:    finasteride 5 mg oral tablet  -- 1 tab(s) by mouth once a day  -- Indication: For BPH (benign prostatic hyperplasia)    aspirin 325 mg oral tablet  -- 1 tab(s) by mouth once a day  -- Indication: For CAD (coronary artery disease)    Rapaflo 8 mg oral capsule  -- 1 cap(s) by mouth once a day  -- Indication: For BPH (benign prostatic hyperplasia)    isosorbide mononitrate 120 mg oral tablet, extended release  -- 1 tab(s) by mouth once a day (in the morning)  -- Indication: For HTN (hypertension)    atorvastatin 20 mg oral tablet  -- 1 tab(s) by mouth once a day  -- Indication: For CAD (coronary artery disease)    clopidogrel 75 mg oral tablet  -- 1 tab(s) by mouth once a day  -- Indication: For CAD (coronary artery disease)    Metoprolol Succinate ER 25 mg oral tablet, extended release  -- 1 tab(s) by mouth every 24 hours  -- Indication: For HTN (hypertension)    omeprazole 20 mg oral delayed release capsule  -- 1 cap(s) by mouth once a day  -- Indication: For GERD (gastroesophageal reflux disease)    levothyroxine 175 mcg (0.175 mg) oral tablet  -- 1 tab(s) by mouth once a day  -- Indication: For Hypothyroidism

## 2018-07-12 NOTE — DISCHARGE NOTE ADULT - CARE PROVIDER_API CALL
Lizandro Mix  [address], [place] [address]  Phone: [phone]  Fax: (   )    -

## 2018-07-12 NOTE — DISCHARGE NOTE ADULT - PROVIDER TOKENS
FREE:[LAST:[Dominguez],FIRST:[Lizandro],PHONE:[phone]],FAX:[phone]   [phone]    -],ADDRESS:[[address] [place] [address] [address] [address]]]

## 2018-07-12 NOTE — DISCHARGE NOTE ADULT - OTHER SIGNIFICANT FINDINGS
Labs  CBC Full  -  ( 11 Jul 2018 13:49 )  WBC Count : 5.5 K/uL  Hemoglobin : 15.2 g/dL  Hematocrit : 44.4 %  Platelet Count - Automated : 166 K/uL  Mean Cell Volume : 87.6 fl  Mean Cell Hemoglobin : 30.0 pg  Mean Cell Hemoglobin Concentration : 34.2 g/dL  Auto Neutrophil # : 3.6 K/uL  Auto Lymphocyte # : 1.4 K/uL  Auto Monocyte # : 0.4 K/uL  Auto Eosinophil # : 0.1 K/uL  Auto Basophil # : 0.0 K/uL  Auto Neutrophil % : 65.2 %  Auto Lymphocyte % : 25.4 %  Auto Monocyte % : 7.2 %  Auto Eosinophil % : 1.4 %  Auto Basophil % : 0.4 %    07-12    143  |  104  |  17.0  ----------------------------<  116<H>  4.0   |  25.0  |  0.94    Ca    9.4      12 Jul 2018 07:24  Phos  3.4     07-12  Mg     2.0     07-12    TPro  7.1  /  Alb  3.9  /  TBili  0.5  /  DBili  x   /  AST  16  /  ALT  16  /  AlkPhos  68  07-11    Troponin T, Serum (07.12.18 @ 01:07)    Troponin T, Serum: <0.01 ng/mL

## 2018-07-12 NOTE — DISCHARGE NOTE ADULT - HOSPITAL COURSE
73 yo male with PMH of CAD w/ 9 stents, HTN, BPH, hypothyroidism, DVT formerly on Coumadin, GERD, skin cancer presented w/ chest pain. Patient was asymptomatic while in the hospital. His work up did not reveal any evidence of an ischemic event. He had negative EKG, no elevation in his troponins. He received a cardiology evaluation and underwent a pharmacological stress test which was negative for severe coronary stenosis. Patient has been cleared by cardiology. He was adamant on being discharged the day following his admission. Patient was advised to continue his home medicaitons and to reduce the Metoprolol to 25mg daily as his heart rate in the hospital was sustaining in the 50s to high 40s.     Patient is medically stable and cleared for discharge.  time spent preparing discharge summary: 30 min.

## 2018-07-12 NOTE — CHART NOTE - NSCHARTNOTEFT_GEN_A_CORE
Mr. Martin was hospitalized at [facility] from July 11 -July 12th 2018. He may return to work on Monday, July 16th. Mr. Martin was hospitalized at [facility] from July 11 -July 12th 2018. He may return to work on Monday, July 16th without restrictions.

## 2018-07-12 NOTE — DISCHARGE NOTE ADULT - SECONDARY DIAGNOSIS.
HTN (hypertension) Hypothyroidism GERD (gastroesophageal reflux disease) BPH (benign prostatic hyperplasia)

## 2019-02-26 NOTE — ED PROVIDER NOTE - CPE EDP CARDIAC NORM
Patient is scheduled for an egd on 3/28/19 at [facility] with dr Robert Pizano     U/S scheduled at [facility] on 2/27/19 at 10:30am - - -

## 2019-10-22 NOTE — ED PROVIDER NOTE - CROS ED NEURO ALL NEG
- - -
As certified below, I, or a nurse practitioner or physician assistant working with me, had a face-to-face encounter that meets the physician face-to-face encounter requirements.

## 2022-02-14 NOTE — PATIENT PROFILE ADULT. - MEDICATIONS BROUGHT TO HOSPITAL, PROFILE
LMOM for pt to call me at [facility] [facility]    Pt is due for a diabetic eye exam  Please assist with scheduling  no

## 2022-03-14 NOTE — ED ADULT NURSE NOTE - TEMPLATE
[facility] Medicine Services  DISCHARGE SUMMARY    Patient Name: Karen Rubio  : 1937  MRN: [id number]    Date of Admission: 3/12/2022  Discharge Diagnosis: Acute on chronic diastolic heart failure.  Date of Discharge: 3/14/2022  Primary Care Physician: Eliza Clayton APRN      Presenting Problem:   Abdominal pain [R10.9]  Pleural effusion [J90]  Atrial fibrillation, chronic (HCC) [I48.20]  Right upper quadrant abdominal pain [R10.11]    Active and Resolved Hospital Problems:  Active Hospital Problems    Diagnosis POA   • **Acute on chronic congestive heart failure (HCC) [I50.9] Yes     Priority: High   • Pleural effusion on right [J90] Yes     Priority: High   • Generalized abdominal pain [R10.84] Yes     Priority: Medium   • Chronic constipation [K59.09] Yes     Priority: Medium   • Anasarca [R60.1] Yes     Priority: Medium   • Abdominal ascites [R18.8] Yes     Priority: Medium   • Oxygen dependent [Z99.81] Not Applicable   • Chronic kidney disease (CKD), stage IV (severe) (HCC) [N18.4] Yes   • Hyperbilirubinemia [E80.6] Yes   • COPD (chronic obstructive pulmonary disease) (HCC) [J44.9] Yes   • Mixed hyperlipidemia [E78.2] Yes   • Long term (current) use of anticoagulants [Z79.01] Not Applicable   • Longstanding persistent atrial fibrillation (HCC) [I48.11] Yes   • Chronic respiratory failure (HCC) [J96.10] Yes   • Hypokalemia [E87.6] Yes   • Coronary artery disease involving native coronary artery of native heart without angina pectoris [I25.10] Yes   • History of CVA (cerebrovascular accident) [Z86.73] Not Applicable   • Pulmonary hypertension (HCC) [I27.20] Yes   • Primary hypertension [I10] Yes   • GERD without esophagitis [K21.9] Yes      Resolved Hospital Problems   No resolved problems to display.         Hospital Course     Hospital Course:  Patient is an 84 y.o. female w/PMH of A. fib, CKD, arthritis, CAD, HLD, CHF, HTN, pulmonary HTN, mitral valve stenosis  who presents to [facility] ED due to abdominal pain. Patient is an extremely poor historian, unable to complete review of systems at this time.  Family reports her abdominal pain is become severe, she has also been complaining of dyspnea, weakness and fatigue over the last several days.  Upon arrival to the ED vitals temp 98.6, , RR 18, /59, O2 sat 99% on 3 L nasal cannula.  Labs notable for troponin 0.011, glucose 136, , K3.0, CO2 25, creatinine 2.16, BUN 32, GFR 22, total bili 2.0, lactic 1.6, lipase 30, magnesium 1.9, WBC 6.4, Hgb 9.5, platelets 146, neutrophils 73.1.  UA negative.  EKG shows atrial fibrillation rate 88, incomplete right bundle branch block, old anterior infarct.  CT abdomen pelvis W/0 shows small to moderate diffuse peritoneal ascites fluid. No organized abscess seen. Diverticulosis without acute diverticulitis. Anasarca. Atherosclerosis. Stable multilevel degenerative changes in the thoracolumbar spine and stable T12 vertebral body compression. Moderate right pleural effusion.  Covid test negative.  Patient treated in ED with 500 mL NS bolus.  Generalized abdominal pain was treated with Roxicodone.  Ascites was treated with diuresis.   Acute on chronic diastolic heart failure was treated with Lasix.  Patient was scheduled for paracentesis but family decided to go home with hospice.  Anasarca was treated with diuresis.  Chronic kidney injury stage IV was monitored.  Hypokalemia was treated with potassium replacement.  Anemia was treated with epoetin.  Gout was treated with allopurinol.  Hypothyroidism was treated with Synthroid.  Hypotension was treated with midodrine.  GERD was treated with Protonix.  Possible spontaneous bacterial peritonitis was treated with antibiotics.  Depression was treated with the Lexapro.  Pulmonary hypertension was treated with revatio.    Chronic constipation was treated with MiraLAX as needed.  Appropriate patient's home medications were  resumed in the hospital for other chronic medical conditions.  Patient and family requested to be discharged home with hospice.  Patient was advised to take her medications as prescribed.  Discharge medications are as per medication reconciliation list.  Patient was advised to follow-up with hospice physician upon arrival at home.  Patient was advised to follow-up with her primary care physician within 3 to 5 days of discharge.  Patient and family agreed with the plan and patient was discharged in stable condition.           DISCHARGE Follow Up Recommendations for labs and diagnostics:     Patient was advised to follow-up with her primary care physician who will review her current medications.        Reasons For Change In Medications and Indications for New Medications:      Day of Discharge     Vital Signs:  Temp:  [97.6 °F (36.4 °C)-97.8 °F (36.6 °C)] 97.6 °F (36.4 °C)  Heart Rate:  [67-85] 84  Resp:  [16-22] 20  BP: ()/(51-70) 99/64  Flow (L/min):  [2-3] 2    Physical Exam:  Physical Exam  Vitals and nursing note reviewed.   Constitutional:       General: She is not in acute distress.     Appearance: She is ill-appearing.   HENT:      Head: Normocephalic and atraumatic.      Nose: Nose normal. No congestion or rhinorrhea.      Mouth/Throat:      Mouth: Mucous membranes are moist.      Pharynx: Oropharynx is clear. No oropharyngeal exudate or posterior oropharyngeal erythema.   Eyes:      Pupils: Pupils are equal, round, and reactive to light.   Cardiovascular:      Pulses: Normal pulses.      Heart sounds: Normal heart sounds. No murmur heard.    No friction rub. No gallop.      Comments: S1 and S2 present.  No tachycardia.  Pulmonary:      Effort: No respiratory distress.      Breath sounds: Rales present. No wheezing or rhonchi.      Comments: Decreased air air entry bilaterally.  Positive crackles.  Chest:      Chest wall: No tenderness.   Abdominal:      General: Abdomen is flat. Bowel sounds are  normal. There is no distension.      Palpations: Abdomen is soft.      Tenderness: There is no right CVA tenderness.   Musculoskeletal:         General: No swelling, tenderness, deformity or signs of injury.      Cervical back: Neck supple. No tenderness.      Right lower leg: No edema.      Left lower leg: No edema.   Skin:     Capillary Refill: Capillary refill takes less than 2 seconds.      Coloration: Skin is not jaundiced.      Findings: No bruising, lesion or rash.   Neurological:      Mental Status: She is alert.      Comments: No facial asymmetry noted.  Gait and station not tested.   Psychiatric:      Comments: No agitation.              Pertinent  and/or Most Recent Results     LAB RESULTS:      Lab 03/14/22  0233 03/13/22 2055 03/13/22  1416 03/13/22  0550 03/12/22 2108 03/12/22 2105   WBC 6.40  --   --  6.90  --  6.40   HEMOGLOBIN 9.3*  --   --  8.9*  --  9.5*   HEMATOCRIT 28.4*  --   --  27.4*  --  29.4*   PLATELETS 139*  --   --  134*  --  146   NEUTROS ABS  --   --   --  5.10  --  4.70   LYMPHS ABS  --   --   --  0.90  --  0.80   MONOS ABS  --   --   --  0.80  --  0.80   EOS ABS  --   --   --  0.10  --  0.10   MCV 77.4*  --   --  76.6*  --  77.4*   PROCALCITONIN  --   --   --  0.11  --   --    LACTATE 1.4 1.6 2.3* 1.1 1.7  --    PROTIME  --   --   --  17.9*  --   --          Lab 03/14/22  0233 03/13/22 2055 03/13/22  0550 03/12/22 2105   SODIUM 135*  --  137 135*   POTASSIUM 3.2* 2.9* 2.8* 3.0*   CHLORIDE 98  --  98 97*   CO2 23.0  --  27.0 25.0   ANION GAP 14.0  --  12.0 13.0   BUN 29*  --  31* 32*   CREATININE 2.08*  --  2.13* 2.16*   EGFR 23.0*  --  22.3* 22.0*   GLUCOSE 103*  --  96 136*   CALCIUM 8.9  --  9.1 9.0   IONIZED CALCIUM 1.15*  --   --   --    MAGNESIUM 1.8  --  1.9 1.9   PHOSPHORUS 2.6  --  3.1  --    TSH  --   --  5.450*  --          Lab 03/14/22  0233 03/13/22  0550 03/12/22  2105 03/10/22  1532   TOTAL PROTEIN 6.5 6.4 6.7 7.3   ALBUMIN 3.20* 3.40* 3.50 3.6   GLOBULIN 3.3 3.0  3.2 3.7   ALT (SGPT) 7 7 7 5*   AST (SGOT) 15 14 16 18   BILIRUBIN 2.1* 2.0*  2.0* 2.0* 1.9*   INDIRECT BILIRUBIN  --  1.1  --   --    BILIRUBIN DIRECT  --  0.9*  --   --    ALK PHOS 103 114 116 128   AMYLASE  --   --   --  45   LIPASE  --   --  30  --          Lab 03/13/22  1416 03/13/22  0550 03/12/22  2105   PROBNP  --  4,738.0*  --    TROPONIN T <0.010 0.013 0.011   PROTIME  --  17.9*  --    INR  --  1.68*  --              Lab 03/13/22  0550   IRON 27*   IRON SATURATION 6*   TIBC 444   TRANSFERRIN 298   FERRITIN 47.18         Brief Urine Lab Results  (Last result in the past 365 days)      Color   Clarity   Blood   Leuk Est   Nitrite   Protein   CREAT   Urine HCG        03/13/22 1136 Yellow   Clear   Negative   Negative   Negative   Negative               Microbiology Results (last 10 days)     Procedure Component Value - Date/Time    COVID PRE-OP / PRE-PROCEDURE SCREENING ORDER (NO ISOLATION) - Swab, Nasopharynx [[id number]]  (Normal) Collected: 03/12/22 2353    Lab Status: Final result Specimen: Swab from Nasopharynx Updated: 03/13/22 0031    Narrative:      The following orders were created for panel order COVID PRE-OP / PRE-PROCEDURE SCREENING ORDER (NO ISOLATION) - Swab, Nasopharynx.  Procedure                               Abnormality         Status                     ---------                               -----------         ------                     COVID-19,CEPHEID/ADEEL,CO...[[id number]]  Normal              Final result                 Please view results for these tests on the individual orders.    COVID-19,CEPHEID/ADEEL,COR/ORA/PAD/CIRILO IN-HOUSE(OR EMERGENT/ADD-ON),NP SWAB IN TRANSPORT MEDIA 3-4 HR TAT, RT-PCR - Swab, Nasopharynx [[id number]]  (Normal) Collected: 03/12/22 2353    Lab Status: Final result Specimen: Swab from Nasopharynx Updated: 03/13/22 0031     COVID19 Not Detected    Narrative:      Fact sheet for providers: https://www.fda.gov/media/264540/download     Fact sheet for patients:  https://www.fda.gov/media/270836/download  Fact sheet for providers: https://www.fda.gov/media/448508/download    Fact sheet for patients: https://www.fda.gov/media/255083/download    Test performed by PCR.          CT Abdomen Pelvis Without Contrast    Result Date: 3/12/2022  Impression: Impression: 1. Limited evaluation without IV contrast. 2. Small to moderate diffuse peritoneal ascites fluid. No organized abscess seen. 3. Diverticulosis without acute diverticulitis. 4. Anasarca. 5. Atherosclerosis. 6. Stable multilevel degenerative changes in the thoracolumbar spine and stable T12 vertebral body compression. 6. Moderate right pleural effusion. Electronically signed by:  Blair Jeffery M.D.  3/12/2022 9:49 PM    US Abdomen Limited    Result Date: 3/13/2022  Impression: Findings compatible with cirrhosis  Status post cholecystectomy  Right upper quadrant ascites    Electronically Signed By-Ole Mojica On:3/13/2022 12:56 PM This report was finalized on [id number] by  Ole Mojica, .    XR Abdomen KUB    Result Date: 3/13/2022  Impression: No evidence of impaction or obstruction  Electronically Signed By-Rajan Holley On:3/13/2022 6:14 PM This report was finalized on [id number] by  Rajan Holley, .      Results for orders placed during the hospital encounter of 03/12/22    Duplex Venous Lower Extremity - Bilateral CAR    Interpretation Summary  · Left popliteal fossa fluid collection.  · Normal bilateral lower extremity venous duplex scan.      Results for orders placed during the hospital encounter of 03/12/22    Duplex Venous Lower Extremity - Bilateral CAR    Interpretation Summary  · Left popliteal fossa fluid collection.  · Normal bilateral lower extremity venous duplex scan.      Results for orders placed during the hospital encounter of 03/12/22    Adult Transthoracic Echo Complete w/ Color, Spectral and Contrast if necessary per protocol    Interpretation Summary  · Estimated left  ventricular EF was in agreement with the calculated left ventricular EF. Left ventricular ejection fraction appears to be greater than 70%. Left ventricular systolic function is hyperdynamic (EF > 70%).  · Left ventricular wall thickness is consistent with moderate concentric hypertrophy.  · Moderate mitral valve regurgitation is present.  · There is moderate, bileaflet mitral valve thickening present.  · Severe tricuspid valve regurgitation is present.  · Estimated right ventricular systolic pressure from tricuspid regurgitation is markedly elevated (>55 mmHg).  · The right ventricular cavity is severely dilated.  · Moderate to severe mitral valve stenosis is present.  · The right atrial cavity is moderately dilated.  · Moderate to severe aortic valve stenosis is present.  · Peak velocity of the flow distal to the aortic valve is 375.4 cm/s. Aortic valve maximum pressure gradient is 56.6 mmHg. Aortic valve mean pressure gradient is 29.9 mmHg.      Labs Pending at Discharge:  Pending Labs     Order Current Status    Blood Culture - Blood, Arm, Left In process    Blood Culture - Blood, Hand, Left In process          Procedures Performed           Consults:   Consults     Date and Time Order Name Status Description    3/13/2022  4:43 PM Inpatient Nephrology Consult Completed     3/13/2022  4:09 AM Inpatient Cardiology Consult Completed     3/13/2022  4:09 AM Inpatient Gastroenterology Consult      3/13/2022 12:34 AM Pulmonology (on-call MD unless specified) Completed     3/12/2022 11:19 PM Hospitalist (on-call MD unless specified)              Discharge Details        Discharge Medications      Changes to Medications      Instructions Start Date   furosemide 40 MG tablet  Commonly known as: LASIX  What changed: when to take this   40 mg, Oral, 2 Times Daily         Continue These Medications      Instructions Start Date   acetaminophen 325 MG tablet  Commonly known as: TYLENOL   650 mg, Oral, Every 4 Hours PRN       albuterol sulfate  (90 Base) MCG/ACT inhaler  Commonly known as: PROVENTIL HFA;VENTOLIN HFA;PROAIR HFA   2 puffs, Inhalation, Every 4 Hours PRN      allopurinol 100 MG tablet  Commonly known as: ZYLOPRIM   1 tablet, Oral, Daily      atorvastatin 20 MG tablet  Commonly known as: LIPITOR   TAKE 1 TABLET EVERY DAY      Eliquis 5 MG tablet tablet  Generic drug: apixaban   TAKE 1 TABLET TWICE DAILY      escitalopram 10 MG tablet  Commonly known as: LEXAPRO   10 mg, Oral, Daily      ipratropium-albuterol 0.5-2.5 mg/3 ml nebulizer  Commonly known as: DUO-NEB   3 mL, Nebulization, 4 Times Daily PRN, O2 bellow 90      levothyroxine 75 MCG tablet  Commonly known as: SYNTHROID, LEVOTHROID   75 mcg, Oral, Daily      metOLazone 2.5 MG tablet  Commonly known as: ZAROXOLYN   1 tablet, Oral, Daily PRN      metoprolol tartrate 25 MG tablet  Commonly known as: LOPRESSOR   12.5 mg, Oral, 2 Times Daily      ondansetron 4 MG tablet  Commonly known as: ZOFRAN   4 mg, Oral, Every 8 Hours PRN      potassium chloride 10 MEQ CR capsule  Commonly known as: MICRO-K   10 mEq, Oral, Daily      sildenafil 25 MG tablet  Commonly known as: VIAGRA   25 mg, Oral, 2 Times Daily      Vitamin D-3 25 MCG (1000 UT) capsule   1 capsule, Oral, Every Other Day             Allergies   Allergen Reactions   • Hydrocodone Shortness Of Breath         Discharge Disposition: Stable.  Hospice/Home    Diet:  Hospital:  Diet Order   Procedures   • Diet Renal; 2gm Na+         Discharge Activity: As tolerated.        CODE STATUS:  Code Status and Medical Interventions:   Ordered at: 03/13/22 7319     Medical Intervention Limits:    NO intubation (DNI)     Level Of Support Discussed With:    Next of Kin (If No Surrogate)    Patient     Code Status (Patient has no pulse and is not breathing):    No CPR (Do Not Attempt to Resuscitate)     Medical Interventions (Patient has pulse or is breathing):    Limited Support         Future Appointments   Date Time Provider  Department Center   8/8/2022 12:50 PM Rebecca Nino MD [facility] None           Time spent on Discharge including face to face service: 40 minutes    This patient has been examined wearing appropriate Personal Protective Equipment and discussed with hospital infection control department, [facility] department, infectious disease specialist and pulmonologist. 03/14/22      Signature: Electronically signed by Keven Escoto MD, FACP, 03/14/22, 3:40 PM EDT.     Cardiac

## 2024-06-28 PROBLEM — K21.9 GASTRO-ESOPHAGEAL REFLUX DISEASE WITHOUT ESOPHAGITIS: Chronic | Status: ACTIVE | Noted: 2018-07-11

## 2024-06-28 PROBLEM — N40.0 BENIGN PROSTATIC HYPERPLASIA WITHOUT LOWER URINARY TRACT SYMPTOMS: Chronic | Status: ACTIVE | Noted: 2018-07-11

## 2024-06-28 PROBLEM — E03.9 HYPOTHYROIDISM, UNSPECIFIED: Chronic | Status: ACTIVE | Noted: 2018-07-11

## 2024-06-28 PROBLEM — C44.90 UNSPECIFIED MALIGNANT NEOPLASM OF SKIN, UNSPECIFIED: Chronic | Status: ACTIVE | Noted: 2018-07-11

## 2024-06-28 PROBLEM — I25.10 ATHEROSCLEROTIC HEART DISEASE OF NATIVE CORONARY ARTERY WITHOUT ANGINA PECTORIS: Chronic | Status: ACTIVE | Noted: 2018-07-11

## 2024-06-28 PROBLEM — I82.409 ACUTE EMBOLISM AND THROMBOSIS OF UNSPECIFIED DEEP VEINS OF UNSPECIFIED LOWER EXTREMITY: Chronic | Status: ACTIVE | Noted: 2018-07-11

## 2024-06-28 PROBLEM — I10 ESSENTIAL (PRIMARY) HYPERTENSION: Chronic | Status: ACTIVE | Noted: 2018-07-11

## 2024-07-11 ENCOUNTER — OUTPATIENT (OUTPATIENT)
Dept: OUTPATIENT SERVICES | Facility: HOSPITAL | Age: 79
LOS: 1 days | Discharge: HOME | End: 2024-07-11
Payer: MEDICARE

## 2024-07-11 VITALS
RESPIRATION RATE: 17 BRPM | WEIGHT: 235.89 LBS | DIASTOLIC BLOOD PRESSURE: 90 MMHG | OXYGEN SATURATION: 99 % | HEART RATE: 71 BPM | TEMPERATURE: 98 F | SYSTOLIC BLOOD PRESSURE: 135 MMHG | HEIGHT: 68 IN

## 2024-07-11 DIAGNOSIS — Z98.890 OTHER SPECIFIED POSTPROCEDURAL STATES: Chronic | ICD-10-CM

## 2024-07-11 DIAGNOSIS — R19.5 OTHER FECAL ABNORMALITIES: ICD-10-CM

## 2024-07-11 PROCEDURE — 88305 TISSUE EXAM BY PATHOLOGIST: CPT

## 2024-07-11 PROCEDURE — 88305 TISSUE EXAM BY PATHOLOGIST: CPT | Mod: 26

## 2024-07-11 RX ORDER — ALFUZOSIN HYDROCHLORIDE 10 MG/1
1 TABLET, EXTENDED RELEASE ORAL
Refills: 0 | DISCHARGE

## 2024-07-11 RX ORDER — ASPIRIN 325 MG/1
1 TABLET, FILM COATED ORAL
Refills: 0 | DISCHARGE

## 2024-07-15 LAB — SURGICAL PATHOLOGY STUDY: SIGNIFICANT CHANGE UP

## 2024-07-19 DIAGNOSIS — Z95.5 PRESENCE OF CORONARY ANGIOPLASTY IMPLANT AND GRAFT: ICD-10-CM

## 2024-07-19 DIAGNOSIS — N40.1 BENIGN PROSTATIC HYPERPLASIA WITH LOWER URINARY TRACT SYMPTOMS: ICD-10-CM

## 2024-07-19 DIAGNOSIS — E03.9 HYPOTHYROIDISM, UNSPECIFIED: ICD-10-CM

## 2024-07-19 DIAGNOSIS — E11.9 TYPE 2 DIABETES MELLITUS WITHOUT COMPLICATIONS: ICD-10-CM

## 2024-07-19 DIAGNOSIS — R19.5 OTHER FECAL ABNORMALITIES: ICD-10-CM

## 2024-07-19 DIAGNOSIS — K64.0 FIRST DEGREE HEMORRHOIDS: ICD-10-CM

## 2024-07-19 DIAGNOSIS — I25.10 ATHEROSCLEROTIC HEART DISEASE OF NATIVE CORONARY ARTERY WITHOUT ANGINA PECTORIS: ICD-10-CM

## 2024-07-19 DIAGNOSIS — Z79.02 LONG TERM (CURRENT) USE OF ANTITHROMBOTICS/ANTIPLATELETS: ICD-10-CM

## 2024-07-19 DIAGNOSIS — K57.30 DIVERTICULOSIS OF LARGE INTESTINE WITHOUT PERFORATION OR ABSCESS WITHOUT BLEEDING: ICD-10-CM

## 2024-07-19 DIAGNOSIS — Z86.718 PERSONAL HISTORY OF OTHER VENOUS THROMBOSIS AND EMBOLISM: ICD-10-CM

## 2024-07-19 DIAGNOSIS — Z79.82 LONG TERM (CURRENT) USE OF ASPIRIN: ICD-10-CM

## 2024-07-19 DIAGNOSIS — K63.5 POLYP OF COLON: ICD-10-CM

## 2024-07-19 DIAGNOSIS — I25.2 OLD MYOCARDIAL INFARCTION: ICD-10-CM

## 2024-07-19 DIAGNOSIS — R12 HEARTBURN: ICD-10-CM

## 2024-07-19 DIAGNOSIS — Z91.041 RADIOGRAPHIC DYE ALLERGY STATUS: ICD-10-CM

## 2024-07-19 DIAGNOSIS — F17.210 NICOTINE DEPENDENCE, CIGARETTES, UNCOMPLICATED: ICD-10-CM

## 2024-07-19 DIAGNOSIS — E78.5 HYPERLIPIDEMIA, UNSPECIFIED: ICD-10-CM

## 2024-07-19 DIAGNOSIS — Z79.890 HORMONE REPLACEMENT THERAPY: ICD-10-CM

## 2024-07-19 DIAGNOSIS — R35.1 NOCTURIA: ICD-10-CM

## 2024-07-19 DIAGNOSIS — I10 ESSENTIAL (PRIMARY) HYPERTENSION: ICD-10-CM

## 2024-07-19 DIAGNOSIS — K21.9 GASTRO-ESOPHAGEAL REFLUX DISEASE WITHOUT ESOPHAGITIS: ICD-10-CM

## 2024-09-24 ENCOUNTER — EMERGENCY (EMERGENCY)
Facility: HOSPITAL | Age: 79
LOS: 0 days | Discharge: HOME | End: 2024-09-24
Attending: EMERGENCY MEDICINE
Payer: MEDICARE

## 2024-09-24 VITALS
DIASTOLIC BLOOD PRESSURE: 89 MMHG | HEART RATE: 54 BPM | TEMPERATURE: 98 F | RESPIRATION RATE: 18 BRPM | SYSTOLIC BLOOD PRESSURE: 155 MMHG | OXYGEN SATURATION: 98 %

## 2024-09-24 VITALS — WEIGHT: 228.4 LBS

## 2024-09-24 DIAGNOSIS — Z91.041 RADIOGRAPHIC DYE ALLERGY STATUS: ICD-10-CM

## 2024-09-24 DIAGNOSIS — I82.462 ACUTE EMBOLISM AND THROMBOSIS OF LEFT CALF MUSCULAR VEIN: ICD-10-CM

## 2024-09-24 DIAGNOSIS — N40.0 BENIGN PROSTATIC HYPERPLASIA WITHOUT LOWER URINARY TRACT SYMPTOMS: ICD-10-CM

## 2024-09-24 DIAGNOSIS — I10 ESSENTIAL (PRIMARY) HYPERTENSION: ICD-10-CM

## 2024-09-24 DIAGNOSIS — E03.9 HYPOTHYROIDISM, UNSPECIFIED: ICD-10-CM

## 2024-09-24 DIAGNOSIS — K21.9 GASTRO-ESOPHAGEAL REFLUX DISEASE WITHOUT ESOPHAGITIS: ICD-10-CM

## 2024-09-24 DIAGNOSIS — R10.9 UNSPECIFIED ABDOMINAL PAIN: ICD-10-CM

## 2024-09-24 DIAGNOSIS — Z98.890 OTHER SPECIFIED POSTPROCEDURAL STATES: Chronic | ICD-10-CM

## 2024-09-24 DIAGNOSIS — I25.10 ATHEROSCLEROTIC HEART DISEASE OF NATIVE CORONARY ARTERY WITHOUT ANGINA PECTORIS: ICD-10-CM

## 2024-09-24 DIAGNOSIS — Z79.02 LONG TERM (CURRENT) USE OF ANTITHROMBOTICS/ANTIPLATELETS: ICD-10-CM

## 2024-09-24 DIAGNOSIS — Z86.718 PERSONAL HISTORY OF OTHER VENOUS THROMBOSIS AND EMBOLISM: ICD-10-CM

## 2024-09-24 DIAGNOSIS — K59.00 CONSTIPATION, UNSPECIFIED: ICD-10-CM

## 2024-09-24 LAB
ALBUMIN SERPL ELPH-MCNC: 4 G/DL — SIGNIFICANT CHANGE UP (ref 3.3–5)
ALP SERPL-CCNC: 74 U/L — SIGNIFICANT CHANGE UP (ref 40–120)
ALT FLD-CCNC: 30 U/L — SIGNIFICANT CHANGE UP (ref 12–78)
ANION GAP SERPL CALC-SCNC: 4 MMOL/L — LOW (ref 5–17)
APPEARANCE UR: CLEAR — SIGNIFICANT CHANGE UP
AST SERPL-CCNC: 14 U/L — LOW (ref 15–37)
BASOPHILS # BLD AUTO: 0.04 K/UL — SIGNIFICANT CHANGE UP (ref 0–0.2)
BASOPHILS NFR BLD AUTO: 0.5 % — SIGNIFICANT CHANGE UP (ref 0–2)
BILIRUB SERPL-MCNC: 0.8 MG/DL — SIGNIFICANT CHANGE UP (ref 0.2–1.2)
BILIRUB UR-MCNC: NEGATIVE — SIGNIFICANT CHANGE UP
BUN SERPL-MCNC: 24 MG/DL — HIGH (ref 7–23)
CALCIUM SERPL-MCNC: 10.3 MG/DL — HIGH (ref 8.5–10.1)
CHLORIDE SERPL-SCNC: 106 MMOL/L — SIGNIFICANT CHANGE UP (ref 96–108)
CO2 SERPL-SCNC: 29 MMOL/L — SIGNIFICANT CHANGE UP (ref 22–31)
COLOR SPEC: YELLOW — SIGNIFICANT CHANGE UP
CREAT SERPL-MCNC: 1.33 MG/DL — HIGH (ref 0.5–1.3)
DIFF PNL FLD: NEGATIVE — SIGNIFICANT CHANGE UP
EGFR: 54 ML/MIN/1.73M2 — LOW
EOSINOPHIL # BLD AUTO: 0.11 K/UL — SIGNIFICANT CHANGE UP (ref 0–0.5)
EOSINOPHIL NFR BLD AUTO: 1.5 % — SIGNIFICANT CHANGE UP (ref 0–6)
GLUCOSE SERPL-MCNC: 174 MG/DL — HIGH (ref 70–99)
GLUCOSE UR QL: NEGATIVE MG/DL — SIGNIFICANT CHANGE UP
HCT VFR BLD CALC: 50.6 % — HIGH (ref 39–50)
HGB BLD-MCNC: 17 G/DL — SIGNIFICANT CHANGE UP (ref 13–17)
IMM GRANULOCYTES NFR BLD AUTO: 0.5 % — SIGNIFICANT CHANGE UP (ref 0–0.9)
KETONES UR-MCNC: NEGATIVE MG/DL — SIGNIFICANT CHANGE UP
LEUKOCYTE ESTERASE UR-ACNC: NEGATIVE — SIGNIFICANT CHANGE UP
LIDOCAIN IGE QN: 47 U/L — SIGNIFICANT CHANGE UP (ref 13–75)
LYMPHOCYTES # BLD AUTO: 1.63 K/UL — SIGNIFICANT CHANGE UP (ref 1–3.3)
LYMPHOCYTES # BLD AUTO: 21.8 % — SIGNIFICANT CHANGE UP (ref 13–44)
MCHC RBC-ENTMCNC: 30.4 PG — SIGNIFICANT CHANGE UP (ref 27–34)
MCHC RBC-ENTMCNC: 33.6 GM/DL — SIGNIFICANT CHANGE UP (ref 32–36)
MCV RBC AUTO: 90.4 FL — SIGNIFICANT CHANGE UP (ref 80–100)
MONOCYTES # BLD AUTO: 0.5 K/UL — SIGNIFICANT CHANGE UP (ref 0–0.9)
MONOCYTES NFR BLD AUTO: 6.7 % — SIGNIFICANT CHANGE UP (ref 2–14)
NEUTROPHILS # BLD AUTO: 5.16 K/UL — SIGNIFICANT CHANGE UP (ref 1.8–7.4)
NEUTROPHILS NFR BLD AUTO: 69 % — SIGNIFICANT CHANGE UP (ref 43–77)
NITRITE UR-MCNC: NEGATIVE — SIGNIFICANT CHANGE UP
PH UR: 5.5 — SIGNIFICANT CHANGE UP (ref 5–8)
PLATELET # BLD AUTO: 215 K/UL — SIGNIFICANT CHANGE UP (ref 150–400)
POTASSIUM SERPL-MCNC: 4.3 MMOL/L — SIGNIFICANT CHANGE UP (ref 3.5–5.3)
POTASSIUM SERPL-SCNC: 4.3 MMOL/L — SIGNIFICANT CHANGE UP (ref 3.5–5.3)
PROT SERPL-MCNC: 8.1 GM/DL — SIGNIFICANT CHANGE UP (ref 6–8.3)
PROT UR-MCNC: NEGATIVE MG/DL — SIGNIFICANT CHANGE UP
RBC # BLD: 5.6 M/UL — SIGNIFICANT CHANGE UP (ref 4.2–5.8)
RBC # FLD: 14 % — SIGNIFICANT CHANGE UP (ref 10.3–14.5)
SODIUM SERPL-SCNC: 139 MMOL/L — SIGNIFICANT CHANGE UP (ref 135–145)
SP GR SPEC: 1.01 — SIGNIFICANT CHANGE UP (ref 1–1.03)
UROBILINOGEN FLD QL: 0.2 MG/DL — SIGNIFICANT CHANGE UP (ref 0.2–1)
WBC # BLD: 7.48 K/UL — SIGNIFICANT CHANGE UP (ref 3.8–10.5)
WBC # FLD AUTO: 7.48 K/UL — SIGNIFICANT CHANGE UP (ref 3.8–10.5)

## 2024-09-24 PROCEDURE — 81003 URINALYSIS AUTO W/O SCOPE: CPT

## 2024-09-24 PROCEDURE — 99284 EMERGENCY DEPT VISIT MOD MDM: CPT | Mod: 25

## 2024-09-24 PROCEDURE — 85025 COMPLETE CBC W/AUTO DIFF WBC: CPT

## 2024-09-24 PROCEDURE — 83690 ASSAY OF LIPASE: CPT

## 2024-09-24 PROCEDURE — 93971 EXTREMITY STUDY: CPT | Mod: LT

## 2024-09-24 PROCEDURE — 36415 COLL VENOUS BLD VENIPUNCTURE: CPT

## 2024-09-24 PROCEDURE — 96374 THER/PROPH/DIAG INJ IV PUSH: CPT

## 2024-09-24 PROCEDURE — 80053 COMPREHEN METABOLIC PANEL: CPT

## 2024-09-24 PROCEDURE — 99285 EMERGENCY DEPT VISIT HI MDM: CPT | Mod: FS

## 2024-09-24 PROCEDURE — 93971 EXTREMITY STUDY: CPT | Mod: 26,LT

## 2024-09-24 RX ORDER — ACETAMINOPHEN 325 MG/1
1000 TABLET ORAL ONCE
Refills: 0 | Status: COMPLETED | OUTPATIENT
Start: 2024-09-24 | End: 2024-09-24

## 2024-09-24 RX ADMIN — ACETAMINOPHEN 400 MILLIGRAM(S): 325 TABLET ORAL at 13:58

## 2024-09-24 RX ADMIN — Medication 296 MILLILITER(S): at 15:32

## 2024-09-24 NOTE — ED STATDOCS - PROGRESS NOTE DETAILS
79 y/o male with a PMHx of BPH, CAD, DVT, GERD, HTN, hypothyroid, skin cancer presents to the ED for multiple medical complaints. Pt c/o left abd pain x weeks. Pt had outpatient CT abd 3 days ago showing gall stones. +vomiting. Pt also reports there is a vein on his LLE that is bothering him. Pt s/p epidural for back pain on 9/18. Pt with continued pain. Pt on Plavix. No other complaints at this time.  Mariella Fernandez PA-C Pt. further stating constipation for weeks.  Taking OTC meds without relief.  Unremarkable colonoscopy in July.  Mariella Fernandez PA-C Pt. further stating constipation for weeks.  Taking OTC meds without relief.  Unremarkable colonoscopy in July.  He had an outpatient CT three days ago without significant findings.  Pt. Mariella Fernandez PA-C Labs unremarkable.  DVT in muscular branches to thel eft gastrocnemius.  Pt. and wife aware.  I spoke with surgical resident and attending and no treatment needed.  I further spoke with patient's daughter and she is aware of findings.  DC with appointment made in two days with vascular Dr. Roy.  Return for any concerns  Mariella Fernandez PA-C Labs unremarkable.  DVT in muscular branches to thel eft gastrocnemius.  Pt. and wife aware.  I spoke with surgical resident and attending and no treatment needed.  I further spoke with patient's daughter and she is aware of findings.  DC with appointment made in two days with vascular Dr. Roy.  Return for any concerns  Magnesium Citrate at discharge to use at home.  Mariella Fernandez PA-C

## 2024-09-24 NOTE — ED STATDOCS - PATIENT PORTAL LINK FT
You can access the FollowMyHealth Patient Portal offered by [facility] by registering at the following website: [email or web address]. By joining "[employer]’s FollowMyHealth portal, you will also be able to view your health information using other applications (apps) compatible with our system.

## 2024-09-24 NOTE — ED STATDOCS - NSFOLLOWUPINSTRUCTIONS_ED_ALL_ED_FT
Acute Abdominal Pain    WHAT YOU NEED TO KNOW:    The cause of your abdominal pain may not be found. If a cause is found, treatment will depend on what the cause is.     DISCHARGE INSTRUCTIONS:    Return to the emergency department if:     You vomit blood or cannot stop vomiting.      You have blood in your bowel movement or it looks like tar.       You have bleeding from your rectum.       Your abdomen is larger than usual, more painful, and hard.       You have severe pain in your abdomen.       You stop passing gas and having bowel movements.       You feel weak, dizzy, or faint.    Contact your healthcare provider if:     You have a fever.      You have new signs and symptoms.      Your symptoms do not get better with treatment.       You have questions or concerns about your condition or care.    Medicines may be given to decrease pain, treat an infection, and manage your symptoms. Take your medicine as directed. Call your healthcare provider if you think your medicine is not helping or if you have side effects. Tell him if you are allergic to any medicine. Keep a list of the medicines, vitamins, and herbs you take. Include the amounts, and when and why you take them. Bring the list or the pill bottles to follow-up visits. Carry your medicine list with you in case of an emergency.    Manage your symptoms:     Apply heat on your abdomen for 20 to 30 minutes every 2 hours for as many days as directed. Heat helps decrease pain and muscle spasms.       Manage your stress. Stress may cause abdominal pain. Your healthcare provider may recommend relaxation techniques and deep breathing exercises to help decrease your stress. Your healthcare provider may recommend you talk to someone about your stress or anxiety, such as a counselor or a trusted friend. Get plenty of sleep and exercise regularly.       Limit or do not drink alcohol. Alcohol can make your abdominal pain worse. Ask your healthcare provider if it is safe for you to drink alcohol. Also ask how much is safe for you to drink.       Do not smoke. Nicotine and other chemicals in cigarettes can damage your esophagus and stomach. Ask your healthcare provider for information if you currently smoke and need help to quit. E-cigarettes or smokeless tobacco still contain nicotine. Talk to your healthcare provider before you use these products.     Make changes to the food you eat as directed: Do not eat foods that cause abdominal pain or other symptoms. Eat small meals more often.     Eat more high-fiber foods if you are constipated. High-fiber foods include fruits, vegetables, whole-grain foods, and legumes.       Do not eat foods that cause gas if you have bloating. Examples include broccoli, cabbage, and cauliflower. Do not drink soda or carbonated drinks, because these may also cause gas.       Do not eat foods or drinks that contain sorbitol or fructose if you have diarrhea and bloating. Some examples are fruit juices, candy, jelly, and sugar-free gum.       Do not eat high-fat foods, such as fried foods, cheeseburgers, hot dogs, and desserts.      Limit or do not drink caffeine. Caffeine may make symptoms, such as heart burn or nausea, worse.       Drink plenty of liquids to prevent dehydration from diarrhea or vomiting. Ask your healthcare provider how much liquid to drink each day and which liquids are best for you.     Follow up with your healthcare provider as directed: Write down your questions so you remember to ask them during your visits.    Deep Vein Thrombosis  A person's legs with close-ups showing a normal vein, a vein with a blood clot, and a blood clot that breaks loose.  Deep vein thrombosis (DVT) is a condition in which a blood clot forms in a vein of the deep venous system. This can occur in the lower leg, thigh, pelvis, arm, or neck. A clot is blood that has thickened into a gel or solid. This condition is serious and can be life-threatening if the clot travels to the arteries of the lungs and causes a blockage (pulmonary embolism). A DVT can also damage veins in the leg, which can lead to long-term venous disease, leg pain, swelling, discoloration, and ulcers or sores (post-thrombotic syndrome).    What are the causes?  This condition may be caused by:  A slowdown of blood flow.  Damage to a vein.  A condition that causes blood to clot more easily, such as certain bleeding disorders.  What increases the risk?  The following factors may make you more likely to develop this condition:  Obesity.  Being older, especially older than age 60.  Being inactive or not moving around (sedentary lifestyle). This may include:  Sitting or lying down for longer than 4–6 hours other than to sleep at night.  Being in the hospital, or having major or lengthy surgery.  Having any recent bone injuries, such as breaks (fractures), that reduce movement, especially in the lower extremities.  Having recent orthopedic surgery on the lower extremities.  Being pregnant, giving birth, or having recently given birth.  Taking medicines that contain estrogen, such as birth control or hormone replacement therapy.  Using products that contain nicotine or tobacco, especially if you use hormonal birth control.  Having a history of a blood vessel disease (peripheral vascular disease) or congestive heart disease.  Having a history of cancer, especially if being treated with chemotherapy.  What are the signs or symptoms?  Symptoms of this condition include:  Swelling, pain, pressure, or tenderness in an arm or a leg.  An arm or a leg becoming warm, red, or discolored.  A leg turning very pale or blue. You may have a large DVT. This is rare.  If the clot is in your leg, you may notice that symptoms get worse when you stand or walk.    In some cases, there are no symptoms.    How is this diagnosed?  This condition is diagnosed with:  Your medical history and a physical exam.  Tests, such as:  Blood tests to check how well your blood clots.  Doppler ultrasound. This is the best way to find a DVT.  CT venogram. Contrast dye is injected into a vein, and X-rays are taken to check for clots. This is helpful for veins in the chest or pelvis.  How is this treated?  Treatment for this condition depends on:  The cause of your DVT.  The size and location of your DVT, or having more than one DVT.  Your risk for bleeding or developing more clots.  Other medical conditions you may have.  Treatment may include:  Taking a blood thinner medicine (anticoagulant) to prevent more clots from forming or current clots from growing.  Wearing compression stockings.  Injecting medicines into the affected vein to break up the clot (catheter-directed thrombolysis).  Surgical procedures, when DVT is severe or hard to treat. These may be done to:  Isolate and remove your clot.  Place an inferior vena cava (IVC) filter. This filter is placed into a large vein called the inferior vena cava to catch blood clots before they reach your lungs.  You may get some medical treatments for 6 months or longer.    Follow these instructions at home:  If you are taking blood thinners:    Talk with your health care provider before you take any medicines that contain aspirin or NSAIDs, such as ibuprofen. These medicines increase your risk for dangerous bleeding.  Take your medicine exactly as told, at the same time every day. Do not skip a dose. Do not take more than the prescribed dose. This is important.  Ask your health care provider about foods and medicines that could change or interact with the way your blood thinner works. Avoid these foods and medicines if you are told to do so.  Avoid anything that may cause bleeding or bruising. You may bleed more easily while taking blood thinners.  Be very careful when using knives, scissors, or other sharp objects.  Use an electric razor instead of a blade.  Avoid activities that could cause injury or bruising, and follow instructions for preventing falls.  Tell your health care provider if you have had any internal bleeding, bleeding ulcers, or neurologic diseases, such as strokes or cerebral aneurysms.  Wear a medical alert bracelet or carry a card that lists what medicines you take.  General instructions    Take over-the-counter and prescription medicines only as told by your health care provider.  Return to your normal activities as told by your health care provider. Ask your health care provider what activities are safe for you.  If recommended, wear compression stockings as told by your health care provider. These stockings help to prevent blood clots and reduce swelling in your legs. Never wear your compression stockings while sleeping at night.  Keep all follow-up visits. This is important.  Where to find more information  American Heart Association: www.heart.org  Centers for Disease Control and Prevention: www.cdc.gov  National Heart, Lung, and Blood [place]: www.nhlbi.nih.gov  Contact a health care provider if:  You miss a dose of your blood thinner.  You have unusual bruising or other color changes.  You have new or worse pain, swelling, or redness in an arm or a leg.  You have worsening numbness or tingling in an arm or a leg.  You have a significant color change (pale or blue) in the extremity that has the DVT.  Get help right away if:  You have signs or symptoms that a blood clot has moved to the lungs. These may include:  Shortness of breath.  Chest pain.  Fast or irregular heartbeats (palpitations).  Light-headedness, dizziness, or fainting.  Coughing up blood.  You have signs or symptoms that your blood is too thin. These may include:  Blood in your vomit, stool, or urine.  A cut that will not stop bleeding.  A menstrual period that is heavier than usual.  A severe headache or confusion.  These symptoms may be an emergency. Get help right away. Call 911.  Do not wait to see if the symptoms will go away.  Do not drive yourself to the hospital.  Summary  Deep vein thrombosis (DVT) happens when a blood clot forms in a deep vein. This may occur in the lower leg, thigh, pelvis, arm, or neck.  Symptoms affect the arm or leg and can include swelling, pain, tenderness, warmth, redness, or discoloration.  This condition may be treated with medicines. In severe cases, a procedure or surgery may be done to remove or dissolve the clots.  If you are taking blood thinners, take them exactly as told. Do not skip a dose. Do not take more than is prescribed.  Get help right away if you have a severe headache, shortness of breath, chest pain, fast or irregular heartbeats, or blood in your vomit, urine, or stool.  This information is not intended to replace advice given to you by your health care provider. Make sure you discuss any questions you have with your health care provider. Acute Abdominal Pain    WHAT YOU NEED TO KNOW:    The cause of your abdominal pain may not be found. If a cause is found, treatment will depend on what the cause is.     DISCHARGE INSTRUCTIONS:    Return to the emergency department if:     You vomit blood or cannot stop vomiting.      You have blood in your bowel movement or it looks like tar.       You have bleeding from your rectum.       Your abdomen is larger than usual, more painful, and hard.       You have severe pain in your abdomen.       You stop passing gas and having bowel movements.       You feel weak, dizzy, or faint.    Contact your healthcare provider if:     You have a fever.      You have new signs and symptoms.      Your symptoms do not get better with treatment.       You have questions or concerns about your condition or care.    Medicines may be given to decrease pain, treat an infection, and manage your symptoms. Take your medicine as directed. Call your healthcare provider if you think your medicine is not helping or if you have side effects. Tell him if you are allergic to any medicine. Keep a list of the medicines, vitamins, and herbs you take. Include the amounts, and when and why you take them. Bring the list or the pill bottles to follow-up visits. Carry your medicine list with you in case of an emergency.    Manage your symptoms:     Apply heat on your abdomen for 20 to 30 minutes every 2 hours for as many days as directed. Heat helps decrease pain and muscle spasms.       Manage your stress. Stress may cause abdominal pain. Your healthcare provider may recommend relaxation techniques and deep breathing exercises to help decrease your stress. Your healthcare provider may recommend you talk to someone about your stress or anxiety, such as a counselor or a trusted friend. Get plenty of sleep and exercise regularly.       Limit or do not drink alcohol. Alcohol can make your abdominal pain worse. Ask your healthcare provider if it is safe for you to drink alcohol. Also ask how much is safe for you to drink.       Do not smoke. Nicotine and other chemicals in cigarettes can damage your esophagus and stomach. Ask your healthcare provider for information if you currently smoke and need help to quit. E-cigarettes or smokeless tobacco still contain nicotine. Talk to your healthcare provider before you use these products.     Make changes to the food you eat as directed: Do not eat foods that cause abdominal pain or other symptoms. Eat small meals more often.     Eat more high-fiber foods if you are constipated. High-fiber foods include fruits, vegetables, whole-grain foods, and legumes.       Do not eat foods that cause gas if you have bloating. Examples include broccoli, cabbage, and cauliflower. Do not drink soda or carbonated drinks, because these may also cause gas.       Do not eat foods or drinks that contain sorbitol or fructose if you have diarrhea and bloating. Some examples are fruit juices, candy, jelly, and sugar-free gum.       Do not eat high-fat foods, such as fried foods, cheeseburgers, hot dogs, and desserts.      Limit or do not drink caffeine. Caffeine may make symptoms, such as heart burn or nausea, worse.       Drink plenty of liquids to prevent dehydration from diarrhea or vomiting. Ask your healthcare provider how much liquid to drink each day and which liquids are best for you.     Follow up with your healthcare provider as directed: Write down your questions so you remember to ask them during your visits.    Deep Vein Thrombosis  A person's legs with close-ups showing a normal vein, a vein with a blood clot, and a blood clot that breaks loose.  Deep vein thrombosis (DVT) is a condition in which a blood clot forms in a vein of the deep venous system. This can occur in the lower leg, thigh, pelvis, arm, or neck. A clot is blood that has thickened into a gel or solid. This condition is serious and can be life-threatening if the clot travels to the arteries of the lungs and causes a blockage (pulmonary embolism). A DVT can also damage veins in the leg, which can lead to long-term venous disease, leg pain, swelling, discoloration, and ulcers or sores (post-thrombotic syndrome).    What are the causes?  This condition may be caused by:  A slowdown of blood flow.  Damage to a vein.  A condition that causes blood to clot more easily, such as certain bleeding disorders.  What increases the risk?  The following factors may make you more likely to develop this condition:  Obesity.  Being older, especially older than age 60.  Being inactive or not moving around (sedentary lifestyle). This may include:  Sitting or lying down for longer than 4–6 hours other than to sleep at night.  Being in the hospital, or having major or lengthy surgery.  Having any recent bone injuries, such as breaks (fractures), that reduce movement, especially in the lower extremities.  Having recent orthopedic surgery on the lower extremities.  Being pregnant, giving birth, or having recently given birth.  Taking medicines that contain estrogen, such as birth control or hormone replacement therapy.  Using products that contain nicotine or tobacco, especially if you use hormonal birth control.  Having a history of a blood vessel disease (peripheral vascular disease) or congestive heart disease.  Having a history of cancer, especially if being treated with chemotherapy.  What are the signs or symptoms?  Symptoms of this condition include:  Swelling, pain, pressure, or tenderness in an arm or a leg.  An arm or a leg becoming warm, red, or discolored.  A leg turning very pale or blue. You may have a large DVT. This is rare.  If the clot is in your leg, you may notice that symptoms get worse when you stand or walk.    In some cases, there are no symptoms.    How is this diagnosed?  This condition is diagnosed with:  Your medical history and a physical exam.  Tests, such as:  Blood tests to check how well your blood clots.  Doppler ultrasound. This is the best way to find a DVT.  CT venogram. Contrast dye is injected into a vein, and X-rays are taken to check for clots. This is helpful for veins in the chest or pelvis.  How is this treated?  Treatment for this condition depends on:  The cause of your DVT.  The size and location of your DVT, or having more than one DVT.  Your risk for bleeding or developing more clots.  Other medical conditions you may have.  Treatment may include:  Taking a blood thinner medicine (anticoagulant) to prevent more clots from forming or current clots from growing.  Wearing compression stockings.  Injecting medicines into the affected vein to break up the clot (catheter-directed thrombolysis).  Surgical procedures, when DVT is severe or hard to treat. These may be done to:  Isolate and remove your clot.  Place an inferior vena cava (IVC) filter. This filter is placed into a large vein called the inferior vena cava to catch blood clots before they reach your lungs.  You may get some medical treatments for 6 months or longer.    Follow these instructions at home:  If you are taking blood thinners:    Talk with your health care provider before you take any medicines that contain aspirin or NSAIDs, such as ibuprofen. These medicines increase your risk for dangerous bleeding.  Take your medicine exactly as told, at the same time every day. Do not skip a dose. Do not take more than the prescribed dose. This is important.  Ask your health care provider about foods and medicines that could change or interact with the way your blood thinner works. Avoid these foods and medicines if you are told to do so.  Avoid anything that may cause bleeding or bruising. You may bleed more easily while taking blood thinners.  Be very careful when using knives, scissors, or other sharp objects.  Use an electric razor instead of a blade.  Avoid activities that could cause injury or bruising, and follow instructions for preventing falls.  Tell your health care provider if you have had any internal bleeding, bleeding ulcers, or neurologic diseases, such as strokes or cerebral aneurysms.  Wear a medical alert bracelet or carry a card that lists what medicines you take.  General instructions    Take over-the-counter and prescription medicines only as told by your health care provider.  Return to your normal activities as told by your health care provider. Ask your health care provider what activities are safe for you.  If recommended, wear compression stockings as told by your health care provider. These stockings help to prevent blood clots and reduce swelling in your legs. Never wear your compression stockings while sleeping at night.  Keep all follow-up visits. This is important.  Where to find more information  American Heart Association: www.heart.org  Centers for Disease Control and Prevention: www.cdc.gov  National Heart, Lung, and Blood [place]: www.nhlbi.nih.gov  Contact a health care provider if:  You miss a dose of your blood thinner.  You have unusual bruising or other color changes.  You have new or worse pain, swelling, or redness in an arm or a leg.  You have worsening numbness or tingling in an arm or a leg.  You have a significant color change (pale or blue) in the extremity that has the DVT.  Get help right away if:  You have signs or symptoms that a blood clot has moved to the lungs. These may include:  Shortness of breath.  Chest pain.  Fast or irregular heartbeats (palpitations).  Light-headedness, dizziness, or fainting.  Coughing up blood.  You have signs or symptoms that your blood is too thin. These may include:  Blood in your vomit, stool, or urine.  A cut that will not stop bleeding.  A menstrual period that is heavier than usual.  A severe headache or confusion.  These symptoms may be an emergency. Get help right away. Call 911.  Do not wait to see if the symptoms will go away.  Do not drive yourself to the hospital.  Summary  Deep vein thrombosis (DVT) happens when a blood clot forms in a deep vein. This may occur in the lower leg, thigh, pelvis, arm, or neck.  Symptoms affect the arm or leg and can include swelling, pain, tenderness, warmth, redness, or discoloration.  This condition may be treated with medicines. In severe cases, a procedure or surgery may be done to remove or dissolve the clots.  If you are taking blood thinners, take them exactly as told. Do not skip a dose. Do not take more than is prescribed.  Get help right away if you have a severe headache, shortness of breath, chest pain, fast or irregular heartbeats, or blood in your vomit, urine, or stool.  This information is not intended to replace advice given to you by your health care provider. Make sure you discuss any questions you have with your health care provider.    Constipation    WHAT YOU NEED TO KNOW:    Constipation is when you have hard, dry bowel movements, or you go longer than usual between bowel movements.     DISCHARGE INSTRUCTIONS:    Return to the emergency department if:     You have blood in your bowel movements.      You have a fever and abdominal pain with the constipation.    Contact your healthcare provider if:     Your constipation gets worse.       You start to vomit.      You have questions or concerns about your condition or care.    Medicines:     Medicine such as a laxative may help relax and loosen your intestines to help you have a bowel movement. Your provider may recommend you only use laxatives for a short time. Long-term use may make your bowels dependent on the medicine.      Take your medicine as directed. Contact your healthcare provider if you think your medicine is not helping or if you have side effects. Tell him of her if you are allergic to any medicine. Keep a list of the medicines, vitamins, and herbs you take. Include the amounts, and when and why you take them. Bring the list or the pill bottles to follow-up visits. Carry your medicine list with you in case of an emergency.    Relieve constipation:     A suppository may be used to help soften your bowel movements. This may make them easier to pass. A suppository is guided into your rectum through your anus.Suppository for Constipation           An enema is liquid medicine used to clear bowel movement from your rectum. The medicine is put into your rectum through your anus.Enemas         Prevent constipation:     Drink liquids as directed. You may need to drink extra liquids to help soften and move your bowels. Ask how much liquid to drink each day and which liquids are best for you.       Eat high-fiber foods. This may help decrease constipation by adding bulk to your bowel movements. High-fiber foods include fruit, vegetables, whole-grain breads and cereals, and beans. Your healthcare provider or dietitian can help you create a high-fiber meal plan. Your provider may also recommend a fiber supplement if you cannot get enough fiber from food.            Exercise regularly. Regular physical activity can help stimulate your intestines. Ask which exercises are best for you.      Schedule a time each day to have a bowel movement. This may help train your body to have regular bowel movements. Bend forward while you are on the toilet to help move the bowel movement out. Sit on the toilet for at least 10 minutes, even if you do not have a bowel movement.     Follow up with your healthcare provider as directed: Write down your questions so you remember to ask them during your visits.

## 2024-09-24 NOTE — ED STATDOCS - DR. NAME
Chuy
Orientation to room/Bed in low position, brakes on/Call light is within reach, educate patient/family on its functionality/Assess for adequate lighting, leave nightlight on/Educate patient/parents of falls protocol precautions/Developmentally place patient in appropriate bed/Keep bed in the lowest position, unless patient is directly attended

## 2024-09-24 NOTE — ED ADULT TRIAGE NOTE - CHIEF COMPLAINT QUOTE
pt ambulatory to triage with spouse c/o L flank pain x1 week. pt states he had epidural for back pain on 9/18. pt also c/o pain to bulging vein on L calf. allergic to IV dye. on plavix and baby asa

## 2024-09-24 NOTE — ED ADULT NURSE NOTE - OBJECTIVE STATEMENT
77 y/o male with a PMHx of BPH, CAD, DVT, GERD, HTN, hypothyroid, skin cancer presents to the ED for multiple medical complaints. Pt c/o left abd pain x weeks. Pt had outpatient CT abd 3 days ago showing gall stones. +vomiting. Pt also reports there is a vein on his LLE that is bothering him. Pt s/p epidural for back pain on 9/18. Pt with continued pain. Pt on Plavix. No other complaints at this time.

## 2024-09-24 NOTE — ED STATDOCS - CARE PROVIDER_API CALL
Lily Roy  Vascular Surgery  [address], [address]  [place] [address]  Phone: [phone]  Fax: [phone]  Follow Up Time:

## 2024-09-24 NOTE — ED STATDOCS - NSICDXPASTMEDICALHX_GEN_ALL_CORE_FT
PAST MEDICAL HISTORY:  BPH (benign prostatic hyperplasia)     CAD (coronary artery disease)     DVT (deep venous thrombosis)     GERD (gastroesophageal reflux disease)     HTN (hypertension)     Hypothyroidism     Skin cancer

## 2024-09-24 NOTE — ED STATDOCS - NSICDXFAMILYHX_GEN_ALL_CORE_FT
FAMILY HISTORY:  Family history of prostate cancer in father    Father  Still living? Unknown  Family history of rheumatoid arthritis, Age at diagnosis: Age Unknown    Mother  Still living? Unknown  Family history of acute myocardial infarction, Age at diagnosis: Age Unknown

## 2024-09-24 NOTE — ED STATDOCS - CLINICAL SUMMARY MEDICAL DECISION MAKING FREE TEXT BOX
Pt with multiple complaints. Regarding LLE vein, will obtain sono though doubt DVT. Pt also with left sided abd pain, had outpatient CT 3 days ago showing gall stones. Will attempt to get results but pt needs to follow up with PMD and GI. Will check labs. Pt also reports back pain for which he had an epidural last week. Pt has an appt with spine in 2 days.

## 2024-09-24 NOTE — ED STATDOCS - OBJECTIVE STATEMENT
77 y/o male with a PMHx of BPH, CAD, DVT, GERD, HTN, hypothyroid, skin cancer presents to the ED for multiple medical complaints. Pt c/o left abd pain x weeks. Pt had outpatient CT abd 3 days ago showing gall stones. +vomiting. Pt also reports there is a vein on his LLE that is bothering him. Pt s/p epidural for back pain on 9/18. Pt with continued pain. Pt on Plavix. No other complaints at this time.
Yes

## 2024-09-24 NOTE — ED STATDOCS - CARE PLAN
1 Principal Discharge DX:	Abdominal pain   Principal Discharge DX:	Abdominal pain  Secondary Diagnosis:	DVT, lower extremity   Principal Discharge DX:	Abdominal pain  Secondary Diagnosis:	DVT, lower extremity  Secondary Diagnosis:	Constipation

## 2024-09-24 NOTE — ED STATDOCS - ATTENDING APP SHARED VISIT CONTRIBUTION OF CARE
I,Marcial Vera MD,  performed the initial face to face bedside interview with this patient regarding history of present illness, review of symptoms and relevant past medical, social and family history.  I completed an independent physical examination.  I was the initial provider who evaluated this patient. I have signed out the follow up of any pending tests (i.e. labs, radiological studies) to the ACP.  I have communicated the patient’s plan of care and disposition with the ACP.  The history, relevant review of systems, past medical and surgical history, medical decision making, and physical examination was documented by the scribe in my presence and I attest to the accuracy of the documentation.

## 2024-09-25 PROBLEM — Z00.00 ENCOUNTER FOR PREVENTIVE HEALTH EXAMINATION: Status: ACTIVE | Noted: 2024-09-25

## 2024-09-26 ENCOUNTER — APPOINTMENT (OUTPATIENT)
Dept: VASCULAR SURGERY | Facility: CLINIC | Age: 79
End: 2024-09-26

## 2024-09-26 ENCOUNTER — APPOINTMENT (OUTPATIENT)
Dept: VASCULAR SURGERY | Facility: CLINIC | Age: 79
End: 2024-09-26
Payer: MEDICARE

## 2024-09-26 ENCOUNTER — TRANSCRIPTION ENCOUNTER (OUTPATIENT)
Age: 79
End: 2024-09-26

## 2024-09-26 VITALS
HEIGHT: 68 IN | DIASTOLIC BLOOD PRESSURE: 84 MMHG | BODY MASS INDEX: 35.77 KG/M2 | SYSTOLIC BLOOD PRESSURE: 153 MMHG | HEART RATE: 65 BPM | WEIGHT: 236 LBS | OXYGEN SATURATION: 98 %

## 2024-09-26 DIAGNOSIS — I83.10 VARICOSE VEINS OF UNSPECIFIED LOWER EXTREMITY WITH INFLAMMATION: ICD-10-CM

## 2024-09-26 DIAGNOSIS — I82.442 ACUTE EMBOLISM AND THROMBOSIS OF LEFT TIBIAL VEIN: ICD-10-CM

## 2024-09-26 PROCEDURE — 93971 EXTREMITY STUDY: CPT | Mod: LT

## 2024-09-26 PROCEDURE — 99203 OFFICE O/P NEW LOW 30 MIN: CPT

## 2024-09-26 RX ORDER — FINASTERIDE 5 MG/1
5 TABLET, FILM COATED ORAL DAILY
Refills: 0 | Status: ACTIVE | COMMUNITY

## 2024-09-26 RX ORDER — RIVAROXABAN 15 MG-20MG
15 & 20 KIT ORAL
Qty: 1 | Refills: 0 | Status: ACTIVE | COMMUNITY
Start: 2024-09-26 | End: 1900-01-01

## 2024-09-26 RX ORDER — ASPIRIN 81 MG
81 TABLET, DELAYED RELEASE (ENTERIC COATED) ORAL DAILY
Refills: 0 | Status: ACTIVE | COMMUNITY

## 2024-09-26 RX ORDER — ATORVASTATIN CALCIUM 20 MG/1
20 TABLET, FILM COATED ORAL DAILY
Refills: 0 | Status: ACTIVE | COMMUNITY

## 2024-09-26 RX ORDER — ISOSORBIDE MONONITRATE 120 MG
120 TABLET, EXTENDED RELEASE 24 HR ORAL DAILY
Refills: 0 | Status: ACTIVE | COMMUNITY

## 2024-09-26 RX ORDER — LEVOTHYROXINE SODIUM 0.15 MG/1
150 TABLET ORAL DAILY
Refills: 0 | Status: ACTIVE | COMMUNITY

## 2024-09-26 RX ORDER — CLOPIDOGREL BISULFATE 75 MG/1
75 TABLET, FILM COATED ORAL DAILY
Refills: 0 | Status: ACTIVE | COMMUNITY

## 2024-09-26 RX ORDER — RIVAROXABAN 20 MG/1
20 TABLET, FILM COATED ORAL
Qty: 90 | Refills: 0 | Status: ACTIVE | COMMUNITY
Start: 2024-09-26 | End: 1900-01-01

## 2024-09-26 RX ORDER — OMEPRAZOLE 20 MG/1
20 CAPSULE, DELAYED RELEASE ORAL DAILY
Refills: 0 | Status: ACTIVE | COMMUNITY

## 2024-09-26 RX ORDER — ALFUZOSIN HYDROCHLORIDE 10 MG/1
10 TABLET, EXTENDED RELEASE ORAL DAILY
Refills: 0 | Status: ACTIVE | COMMUNITY

## 2024-09-26 NOTE — PHYSICAL EXAM
[JVD] : no jugular venous distention  [Normal Breath Sounds] : Normal breath sounds [Normal Rate and Rhythm] : normal rate and rhythm [2+] : left 2+ [de-identified] : well appearing ambulates on his own; hard of hearing [de-identified] : wnl [FreeTextEntry1] : LLE: trace edema at ankle; chronic venous changes; prominent posterior calf varicose veins with palpable phlebitis RLE: significant anterior shin varicose veins not tender

## 2024-09-26 NOTE — HISTORY OF PRESENT ILLNESS
[FreeTextEntry1] : 79yo male with HTN, HLD, BPH is here for new finding of left leg DVT (gastroc vein) patient states for last month he's had worsened left calf pain. He also notes worsened bulging veins in the posterior calf thqat start to hurt with even a few steps. He has a remote history of DVT on the other leg that he states he had to do injections for but it's been years since he's taken AC. Not a smoker. Feels symptoms are stable but not improving. No swelling, no redness.  Wears compression sotckings daily not sure if they help.

## 2024-10-04 RX ORDER — RIVAROXABAN 20 MG/1
20 TABLET, FILM COATED ORAL
Qty: 90 | Refills: 3 | Status: ACTIVE | COMMUNITY
Start: 2024-10-04 | End: 1900-01-01

## 2025-01-08 ENCOUNTER — APPOINTMENT (OUTPATIENT)
Dept: VASCULAR SURGERY | Facility: CLINIC | Age: 80
End: 2025-01-08
Payer: MEDICARE

## 2025-01-08 VITALS
HEART RATE: 64 BPM | HEIGHT: 68 IN | SYSTOLIC BLOOD PRESSURE: 143 MMHG | WEIGHT: 230 LBS | RESPIRATION RATE: 16 BRPM | OXYGEN SATURATION: 97 % | DIASTOLIC BLOOD PRESSURE: 80 MMHG | TEMPERATURE: 97.7 F | BODY MASS INDEX: 34.86 KG/M2

## 2025-01-08 PROCEDURE — 99213 OFFICE O/P EST LOW 20 MIN: CPT

## 2025-01-08 PROCEDURE — 93971 EXTREMITY STUDY: CPT | Mod: LT

## 2025-01-20 ENCOUNTER — OUTPATIENT (OUTPATIENT)
Dept: OUTPATIENT SERVICES | Facility: HOSPITAL | Age: 80
LOS: 1 days | Discharge: HOME | End: 2025-01-20

## 2025-01-20 DIAGNOSIS — I82.409 ACUTE EMBOLISM AND THROMBOSIS OF UNSPECIFIED DEEP VEINS OF UNSPECIFIED LOWER EXTREMITY: ICD-10-CM

## 2025-01-20 DIAGNOSIS — Z98.890 OTHER SPECIFIED POSTPROCEDURAL STATES: Chronic | ICD-10-CM

## 2025-01-23 ENCOUNTER — LABORATORY RESULT (OUTPATIENT)
Age: 80
End: 2025-01-23

## 2025-01-23 ENCOUNTER — APPOINTMENT (OUTPATIENT)
Dept: HEMATOLOGY ONCOLOGY | Facility: CLINIC | Age: 80
End: 2025-01-23
Payer: MEDICARE

## 2025-01-23 VITALS
DIASTOLIC BLOOD PRESSURE: 88 MMHG | HEIGHT: 68 IN | BODY MASS INDEX: 35.18 KG/M2 | OXYGEN SATURATION: 98 % | SYSTOLIC BLOOD PRESSURE: 159 MMHG | HEART RATE: 52 BPM | WEIGHT: 232.12 LBS | TEMPERATURE: 97.3 F

## 2025-01-23 LAB
ALBUMIN SERPL ELPH-MCNC: 4.3 G/DL
ALP BLD-CCNC: 78 U/L
ALT SERPL-CCNC: 18 U/L
ANION GAP SERPL CALC-SCNC: 11 MMOL/L
AST SERPL-CCNC: 16 U/L
BILIRUB SERPL-MCNC: 0.4 MG/DL
BUN SERPL-MCNC: 15 MG/DL
CALCIUM SERPL-MCNC: 9.5 MG/DL
CHLORIDE SERPL-SCNC: 104 MMOL/L
CO2 SERPL-SCNC: 26 MMOL/L
CREAT SERPL-MCNC: 1.04 MG/DL
EGFR: 73 ML/MIN/1.73M2
GLUCOSE SERPL-MCNC: 141 MG/DL
LUPUS ANTICOAGULANT CASCADE REFLEX: NORMAL
POTASSIUM SERPL-SCNC: 4.3 MMOL/L
PROT SERPL-MCNC: 7.1 G/DL
SODIUM SERPL-SCNC: 141 MMOL/L

## 2025-01-23 PROCEDURE — 99204 OFFICE O/P NEW MOD 45 MIN: CPT

## 2025-01-23 RX ORDER — PREDNISONE 50 MG/1
50 TABLET ORAL
Qty: 3 | Refills: 1 | Status: ACTIVE | COMMUNITY
Start: 2025-01-23 | End: 1900-01-01

## 2025-01-23 RX ORDER — DIPHENHYDRAMINE HCL 50 MG/1
50 CAPSULE ORAL
Qty: 1 | Refills: 1 | Status: ACTIVE | COMMUNITY
Start: 2025-01-23 | End: 1900-01-01

## 2025-01-24 LAB
AT III PPP CHRO-ACNC: 98 %
CONFIRM: 32.8 SEC
DRVVT IMM 1:2 NP PPP: NORMAL
DRVVT SCREEN TO CONFIRM RATIO: 0.97 RATIO
FACT V ACT/NOR PPP: 86 %
SCREEN DRVVT: 41 SEC
SILICA CLOTTING TIME INTERPRETATION: NORMAL
SILICA CLOTTING TIME S/C: 0.92 RATIO

## 2025-01-25 ENCOUNTER — APPOINTMENT (OUTPATIENT)
Dept: CT IMAGING | Facility: CLINIC | Age: 80
End: 2025-01-25

## 2025-01-25 ENCOUNTER — OUTPATIENT (OUTPATIENT)
Dept: OUTPATIENT SERVICES | Facility: HOSPITAL | Age: 80
LOS: 1 days | End: 2025-01-25
Payer: MEDICARE

## 2025-01-25 DIAGNOSIS — I82.442 ACUTE EMBOLISM AND THROMBOSIS OF LEFT TIBIAL VEIN: ICD-10-CM

## 2025-01-25 DIAGNOSIS — Z98.890 OTHER SPECIFIED POSTPROCEDURAL STATES: Chronic | ICD-10-CM

## 2025-01-25 PROCEDURE — 71260 CT THORAX DX C+: CPT

## 2025-01-25 PROCEDURE — 71260 CT THORAX DX C+: CPT | Mod: 26

## 2025-01-28 LAB — PTR INTERP: NORMAL

## 2025-02-04 ENCOUNTER — APPOINTMENT (OUTPATIENT)
Dept: HEMATOLOGY ONCOLOGY | Facility: CLINIC | Age: 80
End: 2025-02-04
Payer: MEDICARE

## 2025-02-04 ENCOUNTER — NON-APPOINTMENT (OUTPATIENT)
Age: 80
End: 2025-02-04

## 2025-02-04 VITALS
WEIGHT: 228.28 LBS | BODY MASS INDEX: 34.6 KG/M2 | HEART RATE: 61 BPM | DIASTOLIC BLOOD PRESSURE: 82 MMHG | SYSTOLIC BLOOD PRESSURE: 166 MMHG | OXYGEN SATURATION: 98 % | TEMPERATURE: 97.1 F | HEIGHT: 68 IN

## 2025-02-04 DIAGNOSIS — I82.442 ACUTE EMBOLISM AND THROMBOSIS OF LEFT TIBIAL VEIN: ICD-10-CM

## 2025-02-04 DIAGNOSIS — K76.9 LIVER DISEASE, UNSPECIFIED: ICD-10-CM

## 2025-02-04 PROCEDURE — 99213 OFFICE O/P EST LOW 20 MIN: CPT

## 2025-02-05 ENCOUNTER — APPOINTMENT (OUTPATIENT)
Dept: VASCULAR SURGERY | Facility: CLINIC | Age: 80
End: 2025-02-05

## 2025-02-05 ENCOUNTER — OUTPATIENT (OUTPATIENT)
Dept: OUTPATIENT SERVICES | Facility: HOSPITAL | Age: 80
LOS: 1 days | End: 2025-02-05

## 2025-02-05 ENCOUNTER — APPOINTMENT (OUTPATIENT)
Dept: MRI IMAGING | Facility: CLINIC | Age: 80
End: 2025-02-05
Payer: MEDICARE

## 2025-02-05 DIAGNOSIS — Z98.890 OTHER SPECIFIED POSTPROCEDURAL STATES: Chronic | ICD-10-CM

## 2025-02-05 DIAGNOSIS — K76.9 LIVER DISEASE, UNSPECIFIED: ICD-10-CM

## 2025-02-05 PROCEDURE — 74183 MRI ABD W/O CNTR FLWD CNTR: CPT | Mod: 26

## 2025-02-25 ENCOUNTER — APPOINTMENT (OUTPATIENT)
Dept: HEMATOLOGY ONCOLOGY | Facility: CLINIC | Age: 80
End: 2025-02-25
Payer: MEDICARE

## 2025-02-25 VITALS
OXYGEN SATURATION: 97 % | BODY MASS INDEX: 34.1 KG/M2 | HEIGHT: 68 IN | SYSTOLIC BLOOD PRESSURE: 154 MMHG | DIASTOLIC BLOOD PRESSURE: 85 MMHG | WEIGHT: 225 LBS | HEART RATE: 62 BPM | TEMPERATURE: 97.1 F

## 2025-02-25 PROCEDURE — 99213 OFFICE O/P EST LOW 20 MIN: CPT

## 2025-02-26 NOTE — DISCHARGE NOTE ADULT - DISCHARGE DATE
----- Message from Catarina Christina MD sent at 2/25/2025  2:47 PM CST -----  Please call to inform patient of benign result (inflamed seborrheic keratosis).  No additional treatment needed for this site.   12-Jul-2018 [Normal Appearance] : normal appearance [General Appearance - In No Acute Distress] : no acute distress [FreeTextEntry1] : normal peripheral circulation  [] : no respiratory distress [Abdomen Soft] : soft [Abdomen Tenderness] : non-tender [Costovertebral Angle Tenderness] : no ~M costovertebral angle tenderness [Urethral Meatus] : meatus normal [Penis Abnormality] : normal uncircumcised penis [Scrotum] : the scrotum was normal [Epididymis] : the epididymides were normal [Testes Tenderness] : no tenderness of the testes [Testes Mass (___cm)] : there were no testicular masses [Prostate Tenderness] : the prostate was not tender [No Prostate Nodules] : no prostate nodules [Prostate Size ___ (0-4)] : prostate size [unfilled] (scale: 0-4) [Normal Station and Gait] : the gait and station were normal for the patient's age [Skin Color & Pigmentation] : normal skin color and pigmentation [No Focal Deficits] : no focal deficits [Oriented To Time, Place, And Person] : oriented to person, place, and time [No Palpable Adenopathy] : no palpable adenopathy

## 2025-05-19 ENCOUNTER — OUTPATIENT (OUTPATIENT)
Dept: OUTPATIENT SERVICES | Facility: HOSPITAL | Age: 80
LOS: 1 days | Discharge: HOME | End: 2025-05-19

## 2025-05-19 DIAGNOSIS — I82.409 ACUTE EMBOLISM AND THROMBOSIS OF UNSPECIFIED DEEP VEINS OF UNSPECIFIED LOWER EXTREMITY: ICD-10-CM

## 2025-05-19 DIAGNOSIS — Z98.890 OTHER SPECIFIED POSTPROCEDURAL STATES: Chronic | ICD-10-CM

## 2025-05-27 ENCOUNTER — APPOINTMENT (OUTPATIENT)
Dept: HEMATOLOGY ONCOLOGY | Facility: CLINIC | Age: 80
End: 2025-05-27